# Patient Record
Sex: FEMALE | Race: WHITE | URBAN - METROPOLITAN AREA
[De-identification: names, ages, dates, MRNs, and addresses within clinical notes are randomized per-mention and may not be internally consistent; named-entity substitution may affect disease eponyms.]

---

## 2017-12-01 ENCOUNTER — OFFICE VISIT (OUTPATIENT)
Dept: ORTHOPEDIC SURGERY | Age: 32
End: 2017-12-01

## 2017-12-01 VITALS — HEIGHT: 62 IN | WEIGHT: 150 LBS | BODY MASS INDEX: 27.6 KG/M2

## 2017-12-01 PROCEDURE — 99243 OFF/OP CNSLTJ NEW/EST LOW 30: CPT | Performed by: ORTHOPAEDIC SURGERY

## 2017-12-01 PROCEDURE — G8484 FLU IMMUNIZE NO ADMIN: HCPCS | Performed by: ORTHOPAEDIC SURGERY

## 2017-12-01 PROCEDURE — G8419 CALC BMI OUT NRM PARAM NOF/U: HCPCS | Performed by: ORTHOPAEDIC SURGERY

## 2017-12-01 PROCEDURE — 20550 NJX 1 TENDON SHEATH/LIGAMENT: CPT | Performed by: ORTHOPAEDIC SURGERY

## 2017-12-01 PROCEDURE — G8427 DOCREV CUR MEDS BY ELIG CLIN: HCPCS | Performed by: ORTHOPAEDIC SURGERY

## 2017-12-01 NOTE — PROGRESS NOTES
CC:  Finger pain    HISTORY OF PRESENT ILLNESS:   Carloz Ambrocio is a 28 y.o. female right-hand-dominant, history of rheumatoid arthritis, no history of diabetes, who presents for evaluation and treatment of bilateral hand triggering, most symptomatic right ring finger, that began years ago but has been worsening over the past 2-3 months. This affects daily activities involving gripping. Treatment to date has been NSAID's, without significant relief. She has locking daily of both ring fingers, worse on the right. 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 A specialty hand and upper extremity consultation was requested by Dr. Taylor Falcon for evaluation treatment of finger triggering    Medical History:  No past medical history on file. No past surgical history on file. No family history on file. Social History     Social History    Marital status: Legally      Spouse name: N/A    Number of children: N/A    Years of education: N/A     Social History Main Topics    Smoking status: Current Every Day Smoker    Smokeless tobacco: Never Used    Alcohol use None    Drug use: Unknown    Sexual activity: Not Asked     Other Topics Concern    None     Social History Narrative    None     No current outpatient prescriptions on file. No current facility-administered medications for this visit. Allergies   Allergen Reactions    Clindamycin Rash       ROS:  ROS neg except for positives in HPI    PHYSICAL EXAMINATION:   Gen/Psych: Examination reveals a pleasant individual in no acute distress. The patient is oriented to time, place and person. The patient's mood and affect are appropriate. Lymph: The lymphatic examination bilaterally reveals all areas to be without enlargement or induration. Skin intact without lymphadenopathy, discoloration, or abnormal temperature. Vascular: There is intact, symmetric circulation in both upper extremities.     Musculoskeletal       Cervical spine, shoulders, elbows, wrist:  satisfactory baseline range of motion, strength, and stability bilaterally        bilateral long finger, ring finger, thumb:                               moderate  tenderness at the A1 pulley                            moderate triggering with flexion and  extension. There is locking and unlocking of both ring fingers    Neurological:  Sensation is subjectively normal in hands bilaterally    Radiology:     None today  Additional Studies:    IMPRESSION AND PLAN: Trigger digits right and left hand. Right ring finger most symptomatic    I discussed the entity of trigger finger with the patient. I fully outlined the mechanics behind the triggering and locking and appropriate conservative and surgical options. All their questions were answered fully. At this point the patient is symptomatic, and injection was recommended. We discussed trigger finger injection right ring finger, she was in agreement. The risks and benefits of cortisone injection were discussed thoroughly. Risks discussed included infection, adverse drug reaction, increased pain post-injection, skin de-pigmentation, fatty atrophy, and persistent clinical symptoms despite injection. Use of ethyl chloride spray during injection to decrease injection site pain was discussed. The injection was given after cleansing the skin with alcohol. The right ring finger trigger digit was injected with 1 mL of 1% lidocaine without epinephrine and 1 mL of Celestone without difficulty. The patient tolerated the injection well and a Band-aid was placed. Discussed activity modifications and splinting of the digits at night. She will follow up as symptoms dictate. We appreciate the patient referral.  All questions and concerns were addressed today. Patient is in agreement with the plan.         Joaquin Schuster MD  Hand & Upper Extremity Surgery  [address] [facility] partner of [facility] ([facility])

## 2017-12-06 ENCOUNTER — TELEPHONE (OUTPATIENT)
Dept: ORTHOPEDIC SURGERY | Age: 32
End: 2017-12-06

## 2017-12-10 RX ORDER — OXYCODONE HYDROCHLORIDE AND ACETAMINOPHEN 5; 325 MG/1; MG/1
2 TABLET ORAL PRN
Status: ACTIVE | OUTPATIENT
Start: 2017-12-10 | End: 2017-12-10

## 2017-12-10 RX ORDER — OXYCODONE HYDROCHLORIDE AND ACETAMINOPHEN 5; 325 MG/1; MG/1
1 TABLET ORAL PRN
Status: ACTIVE | OUTPATIENT
Start: 2017-12-10 | End: 2017-12-10

## 2017-12-10 RX ORDER — HYDRALAZINE HYDROCHLORIDE 20 MG/ML
5 INJECTION INTRAMUSCULAR; INTRAVENOUS EVERY 30 MIN PRN
Status: DISCONTINUED | OUTPATIENT
Start: 2017-12-10 | End: 2017-12-12 | Stop reason: HOSPADM

## 2017-12-10 RX ORDER — LABETALOL HYDROCHLORIDE 5 MG/ML
5 INJECTION, SOLUTION INTRAVENOUS
Status: DISCONTINUED | OUTPATIENT
Start: 2017-12-10 | End: 2017-12-12 | Stop reason: HOSPADM

## 2017-12-10 RX ORDER — DIPHENHYDRAMINE HYDROCHLORIDE 50 MG/ML
6.25 INJECTION INTRAMUSCULAR; INTRAVENOUS
Status: ACTIVE | OUTPATIENT
Start: 2017-12-10 | End: 2017-12-10

## 2017-12-10 RX ORDER — HYDROMORPHONE HCL 110MG/55ML
0.5 PATIENT CONTROLLED ANALGESIA SYRINGE INTRAVENOUS EVERY 10 MIN PRN
Status: DISCONTINUED | OUTPATIENT
Start: 2017-12-10 | End: 2017-12-12 | Stop reason: HOSPADM

## 2017-12-10 RX ORDER — HYDROMORPHONE HCL 110MG/55ML
0.5 PATIENT CONTROLLED ANALGESIA SYRINGE INTRAVENOUS EVERY 5 MIN PRN
Status: DISCONTINUED | OUTPATIENT
Start: 2017-12-10 | End: 2017-12-12 | Stop reason: HOSPADM

## 2017-12-10 RX ORDER — ONDANSETRON 2 MG/ML
4 INJECTION INTRAMUSCULAR; INTRAVENOUS EVERY 30 MIN PRN
Status: DISCONTINUED | OUTPATIENT
Start: 2017-12-10 | End: 2017-12-12 | Stop reason: HOSPADM

## 2017-12-10 RX ORDER — MEPERIDINE HYDROCHLORIDE 25 MG/ML
12.5 INJECTION INTRAMUSCULAR; INTRAVENOUS; SUBCUTANEOUS EVERY 5 MIN PRN
Status: DISCONTINUED | OUTPATIENT
Start: 2017-12-10 | End: 2017-12-12 | Stop reason: HOSPADM

## 2017-12-11 ENCOUNTER — HOSPITAL ENCOUNTER (OUTPATIENT)
Dept: SURGERY | Age: 32
Discharge: HOME | End: 2017-12-11
Attending: ORTHOPAEDIC SURGERY | Admitting: ORTHOPAEDIC SURGERY

## 2017-12-11 VITALS
WEIGHT: 150 LBS | RESPIRATION RATE: 16 BRPM | BODY MASS INDEX: 27.6 KG/M2 | HEIGHT: 62 IN | DIASTOLIC BLOOD PRESSURE: 95 MMHG | TEMPERATURE: 98.5 F | HEART RATE: 76 BPM | OXYGEN SATURATION: 99 % | SYSTOLIC BLOOD PRESSURE: 136 MMHG

## 2017-12-11 LAB — PREGNANCY, URINE: NEGATIVE

## 2017-12-11 RX ORDER — 0.9 % SODIUM CHLORIDE 0.9 %
10 VIAL (ML) INJECTION PRN
Status: DISCONTINUED | OUTPATIENT
Start: 2017-12-11 | End: 2017-12-12 | Stop reason: HOSPADM

## 2017-12-11 RX ORDER — OXYCODONE HYDROCHLORIDE AND ACETAMINOPHEN 5; 325 MG/1; MG/1
TABLET ORAL
Status: DISPENSED
Start: 2017-12-11 | End: 2017-12-11

## 2017-12-11 RX ORDER — SODIUM CHLORIDE, SODIUM LACTATE, POTASSIUM CHLORIDE, CALCIUM CHLORIDE 600; 310; 30; 20 MG/100ML; MG/100ML; MG/100ML; MG/100ML
INJECTION, SOLUTION INTRAVENOUS CONTINUOUS
Status: DISCONTINUED | OUTPATIENT
Start: 2017-12-11 | End: 2017-12-12 | Stop reason: HOSPADM

## 2017-12-11 RX ORDER — IBUPROFEN 200 MG
200 TABLET ORAL EVERY 6 HOURS PRN
COMMUNITY
End: 2018-04-23 | Stop reason: ALTCHOICE

## 2017-12-11 RX ORDER — OXYCODONE HYDROCHLORIDE AND ACETAMINOPHEN 5; 325 MG/1; MG/1
1 TABLET ORAL ONCE
Status: COMPLETED | OUTPATIENT
Start: 2017-12-11 | End: 2017-12-11

## 2017-12-11 RX ORDER — OXYCODONE HYDROCHLORIDE AND ACETAMINOPHEN 5; 325 MG/1; MG/1
1 TABLET ORAL EVERY 6 HOURS PRN
Qty: 25 TABLET | Refills: 0 | Status: SHIPPED | OUTPATIENT
Start: 2017-12-11 | End: 2017-12-18

## 2017-12-11 RX ORDER — LIDOCAINE HYDROCHLORIDE 10 MG/ML
1 INJECTION, SOLUTION EPIDURAL; INFILTRATION; INTRACAUDAL; PERINEURAL
Status: COMPLETED | OUTPATIENT
Start: 2017-12-11 | End: 2017-12-11

## 2017-12-11 RX ORDER — 0.9 % SODIUM CHLORIDE 0.9 %
10 VIAL (ML) INJECTION EVERY 12 HOURS SCHEDULED
Status: DISCONTINUED | OUTPATIENT
Start: 2017-12-11 | End: 2017-12-12 | Stop reason: HOSPADM

## 2017-12-11 RX ADMIN — LIDOCAINE HYDROCHLORIDE 1 ML: 10 INJECTION, SOLUTION EPIDURAL; INFILTRATION; INTRACAUDAL; PERINEURAL at 08:15

## 2017-12-11 RX ADMIN — SODIUM CHLORIDE, SODIUM LACTATE, POTASSIUM CHLORIDE, CALCIUM CHLORIDE: 600; 310; 30; 20 INJECTION, SOLUTION INTRAVENOUS at 08:15

## 2017-12-11 RX ADMIN — OXYCODONE HYDROCHLORIDE AND ACETAMINOPHEN 1 TABLET: 5; 325 TABLET ORAL at 09:40

## 2017-12-11 ASSESSMENT — PAIN DESCRIPTION - PAIN TYPE
TYPE: SURGICAL PAIN
TYPE: SURGICAL PAIN

## 2017-12-11 ASSESSMENT — PAIN DESCRIPTION - DESCRIPTORS
DESCRIPTORS: ACHING

## 2017-12-11 ASSESSMENT — PAIN SCALES - GENERAL
PAINLEVEL_OUTOF10: 0
PAINLEVEL_OUTOF10: 3
PAINLEVEL_OUTOF10: 2
PAINLEVEL_OUTOF10: 3

## 2017-12-11 ASSESSMENT — PAIN DESCRIPTION - LOCATION
LOCATION: HAND
LOCATION: HAND

## 2017-12-11 ASSESSMENT — PAIN DESCRIPTION - ONSET
ONSET: ON-GOING
ONSET: ON-GOING

## 2017-12-11 ASSESSMENT — PAIN DESCRIPTION - FREQUENCY
FREQUENCY: INTERMITTENT
FREQUENCY: INTERMITTENT

## 2017-12-11 ASSESSMENT — PAIN - FUNCTIONAL ASSESSMENT: PAIN_FUNCTIONAL_ASSESSMENT: 0-10

## 2017-12-11 ASSESSMENT — PAIN DESCRIPTION - ORIENTATION
ORIENTATION: RIGHT
ORIENTATION: RIGHT

## 2017-12-11 NOTE — PROGRESS NOTES
Phase II:  1. Patient is identified using name and the date of birth. 2. The patient is free from signs and symptoms of chemical, electrical, laser, radiation, positioning, or transfer/transport injury. 3. The patient receives appropriate medication(s), safely administered during the Perioperative period. 4. The patient has wound/tissue perfusion consistent with or improved from baseline levels established preoperatively. 5. The patient is at or returning to normothermia at the conclusion of the immediate postoperative period. 6. The patient's fluid, electrolyte, and acid base balances are consistent with or improved from baseline levels established preoperatively. 7. The patient's pulmonary function is consistent with or improved from baseline levels established preoperatively. 8. The patient's cardiovascular status is consistent with or improved from baseline levels established preoperatively. 9. The patient/caregiver demonstrates knowledge of nutritional management related to the operative or other invasive procedure. 10. The patient/caregiver demonstrates knowledge of medication, pain, and wound management. 11. The patient participates in the rehabilitation process as applicable. 12. The patient/caregiver participates in decisions affection his or her Perioperative plan of care. 13. The patient's care is consistent with the individualized Perioperative plan of care. 14. The patient's right to privacy is maintained. 15. The patient is the recipient of competent and ethical care within legal standards of practice. 16. The patient's value system, lifestyle, ethnicity, and culture are considered, respected, and incorporated in the Perioperative plan of care and understands special services available. 17. The patient demonstrates and/or reports adequate pain control throughout the the Perioperative period. 18.  The patient's neurological status is consistent with or improved from baseline levels

## 2017-12-11 NOTE — PROGRESS NOTES
Pt arrived from OR, report received, awake, A&O, at bedside to monitor, vitals stable, patient denies pain

## 2017-12-11 NOTE — OP NOTE
Phil Mary (1985)    Date of Surgery- 12/11/2017    Pre-Op Diagnoses:  1. Trigger digit, right ring finger  2. Left thumb trigger digit  3. Left ring finger trigger digit              Post-op Diagnoses:   1. Same     2. Same  3. Same    Procedure(s) Performed:  1. Release trigger digit, right ring finger, A1 pulley release     2. Cortisone injection left thumb trigger digit  3. Cortisone injection left ring finger trigger digit    Surgeon: Bridgette Barreto MD    Anesthesia Type:   Local/MAC    Blood Loss:   Minimal    Antibiotics:  See Anesthesia Note    DVT prophylaxis:  See Anesthesia Note    Pathology:   None    Complications:   None    Assistant:  None    The surgical sites were marked in preop holding by Dr John Rubio. Risks and benefits of the procedure were discussed again with the patient. The informed and signed consent was on the chart. The patient was brought to the operating and room and placed in the supine position. After the induction of anesthesia a standard time-out was performed. Description of the Procedure: The right upper extremity had been prepped and draped in the normal sterile fashion. Timeout was held before the local injection and then repeat Timeout was held after the formal draping procedure. The right upper extremity was exsanguinated and the tourniquet was inflated to 250 mmHg. A standard 1.5 cm oblique incision was created over the A1 pulley through skin only, overlying the right ring finger. Blunt scissor dissection was taken to the subcutaneous tissues down to the flexor tendon sheath. The A1 pulley was identified and isolated. Blunt retractors safely and gently retracted the neurovascular bundles on either side. Under direct visualization, the A1 pulley was released completely. The A0 pulley fibers were also identified and released. The A2 pulley was protected.   At this time the FDP and FDS had excellent excursion up through the wound. The patient was under light sedation, was able to flex and extend the finger gently, no persistent clicking or locking. No other abnormality was noted. The wound was copiously irrigated and then the skin was closed with interrupted nylon suture. Tourniquet was deflated and all fingers were warm and well perfused. Soft sterile dressing was placed. Under sterile conditions, both the left trigger thumb and left ring finger trigger digit were each separately injected with 1cc of 1% lidocaine and 1cc of celestone without difficulty. Bandaids were placed. Patient was awoken from sedation, tolerated the procedure well and was taken to the postanesthesia recovery unit in good condition. No complications during the case. Findings:         Intervention:  1% Lidocaine, 0.25% Marcaine Local injection, no epinephrine    Other Notes: Follow-up 7-10 days, wound inspection, likely suture removal.  Home exercise program for ROM, massage, progressive activities. Hand Therapy referral with modalities, if needed, or if patient desires.         Denia Cherry MD  Hand & Upper Extremity Surgery  [address] [facility] of [facility] ([facility])

## 2017-12-11 NOTE — ANESTHESIA POST-OP
Postoperative Anesthesia Note    Name:    Bethany Maxwell  MRN:      [id number]    Patient Vitals for the past 12 hrs:   BP Temp Temp src Pulse Resp SpO2 Height Weight   12/11/17 0935 (!) 136/95 - - 76 16 99 % - -   12/11/17 0921 126/84 - - 74 16 99 % - -   12/11/17 0916 121/80 - - 80 15 97 % - -   12/11/17 0911 133/85 - - 79 15 99 % - -   12/11/17 0906 138/88 98.5 °F (36.9 °C) Temporal 85 14 99 % - -   12/11/17 0749 (!) 146/93 98 °F (36.7 °C) Temporal 91 16 100 % 5' 2\" (1.575 m) 150 lb (68 kg)        LABS:    CBC  No results found for: WBC, HGB, HCT, PLT  RENAL  No results found for: NA, K, CL, CO2, BUN, CREATININE, GLUCOSE  COAGS  No results found for: PROTIME, INR, APTT    Intake & Output: In: 400 [I.V.:400]  Out: -     Nausea & Vomiting:  No    Level of Consciousness:  Awake    Pain Assessment:  Adequate analgesia    Anesthesia Complications:  No apparent anesthetic complications    SUMMARY      Vital signs stable  OK to discharge from Stage I post anesthesia care.   Care transferred from Anesthesiology department on discharge from perioperative area

## 2017-12-11 NOTE — ANESTHESIA PRE-OP
Ready to quit: Not Answered  Counseling given: Not Answered      Vital Signs (Current): There were no vitals filed for this visit. BP Readings from Last 3 Encounters:   No data found for BP       NPO Status:                                                                                 BMI:   Wt Readings from Last 3 Encounters:   12/01/17 150 lb (68 kg)     There is no height or weight on file to calculate BMI. Anesthesia Evaluation  Patient summary reviewed and Nursing notes reviewed no history of anesthetic complications:   Airway: Mallampati: II     Neck ROM: full   Dental:          Pulmonary:                              Cardiovascular:                      Neuro/Psych:               GI/Hepatic/Renal:             Endo/Other:                     Abdominal:           Vascular:                                        Anesthesia Plan      MAC     ASA 1       Induction: intravenous. Anesthetic plan and risks discussed with patient. Plan discussed with CRNA.                   Precious Leyva MD   12/10/2017

## 2017-12-20 ENCOUNTER — OFFICE VISIT (OUTPATIENT)
Dept: ORTHOPEDIC SURGERY | Age: 32
End: 2017-12-20

## 2017-12-20 VITALS — WEIGHT: 149.91 LBS | HEIGHT: 59 IN | BODY MASS INDEX: 30.22 KG/M2

## 2017-12-20 DIAGNOSIS — M65.341 TRIGGER RING FINGER OF RIGHT HAND: ICD-10-CM

## 2017-12-20 PROCEDURE — 99024 POSTOP FOLLOW-UP VISIT: CPT | Performed by: ORTHOPAEDIC SURGERY

## 2018-02-14 LAB
ABO/RH: NORMAL
ANTIBODY SCREEN: POSITIVE
HEPATITIS B SURFACE ANTIGEN INTERPRETATION: NEGATIVE
HIV-1 AND HIV-2 ANTIBODIES: NEGATIVE
RPR: NONREACTIVE
RUBELLA ANTIBODY IGG: NORMAL

## 2018-02-15 ENCOUNTER — HOSPITAL ENCOUNTER (OUTPATIENT)
Dept: ULTRASOUND IMAGING | Age: 33
Discharge: HOME | End: 2018-02-15
Attending: OBSTETRICS & GYNECOLOGY | Admitting: OBSTETRICS & GYNECOLOGY

## 2018-02-15 DIAGNOSIS — O09.891 SUPERVISION OF OTHER HIGH RISK PREGNANCIES, FIRST TRIMESTER: ICD-10-CM

## 2018-02-15 DIAGNOSIS — N91.2 AMENORRHEA: ICD-10-CM

## 2018-03-06 LAB — CHLAMYDIA TRACHOMATIS AMPLIFIED DET: NEGATIVE

## 2018-04-02 ENCOUNTER — HOSPITAL ENCOUNTER (OUTPATIENT)
Dept: OTHER | Age: 33
Discharge: HOME | End: 2018-04-30
Attending: OBSTETRICS & GYNECOLOGY | Admitting: OBSTETRICS & GYNECOLOGY

## 2018-04-03 ENCOUNTER — ROUTINE PRENATAL (OUTPATIENT)
Dept: PERINATAL CARE | Age: 33
End: 2018-04-03

## 2018-04-03 DIAGNOSIS — O35.9XX0 FETAL ABNORMALITY AFFECTING MANAGEMENT OF MOTHER, SINGLE OR UNSPECIFIED FETUS: ICD-10-CM

## 2018-04-03 DIAGNOSIS — Z36.89 ENCOUNTER FOR FETAL ANATOMIC SURVEY: Primary | ICD-10-CM

## 2018-04-23 ENCOUNTER — ROUTINE PRENATAL (OUTPATIENT)
Dept: PERINATAL CARE | Age: 33
End: 2018-04-23

## 2018-04-23 VITALS
SYSTOLIC BLOOD PRESSURE: 119 MMHG | DIASTOLIC BLOOD PRESSURE: 80 MMHG | HEART RATE: 60 BPM | WEIGHT: 169 LBS | BODY MASS INDEX: 34.07 KG/M2

## 2018-04-23 DIAGNOSIS — O35.9XX0 FETAL ABNORMALITY AFFECTING MANAGEMENT OF MOTHER, SINGLE OR UNSPECIFIED FETUS: Primary | ICD-10-CM

## 2018-05-01 ENCOUNTER — HOSPITAL ENCOUNTER (OUTPATIENT)
Dept: OTHER | Age: 33
Discharge: HOME | End: 2018-05-31
Attending: OBSTETRICS & GYNECOLOGY | Admitting: OBSTETRICS & GYNECOLOGY

## 2018-08-07 ENCOUNTER — HOSPITAL ENCOUNTER (INPATIENT)
Age: 33
LOS: 3 days | Discharge: HOME | DRG: 560 | End: 2018-08-10
Attending: OBSTETRICS & GYNECOLOGY | Admitting: OBSTETRICS & GYNECOLOGY
Payer: MEDICAID

## 2018-08-07 PROBLEM — O99.820 GBS (GROUP B STREPTOCOCCUS CARRIER), +RV CULTURE, CURRENTLY PREGNANT: Status: ACTIVE | Noted: 2018-08-07

## 2018-08-07 PROBLEM — Z3A.37 37 WEEKS GESTATION OF PREGNANCY: Status: ACTIVE | Noted: 2018-08-07

## 2018-08-07 PROBLEM — O42.90 DELAYED DELIVERY AFTER SROM (SPONTANEOUS RUPTURE OF MEMBRANES): Status: ACTIVE | Noted: 2018-08-07

## 2018-08-07 PROBLEM — O09.893 SUPERVISION OF OTHER HIGH RISK PREGNANCIES, THIRD TRIMESTER: Status: ACTIVE | Noted: 2018-08-07

## 2018-08-07 PROBLEM — O99.333: Status: ACTIVE | Noted: 2018-08-07

## 2018-08-07 PROBLEM — O99.213 MATERNAL OBESITY, ANTEPARTUM, THIRD TRIMESTER: Status: ACTIVE | Noted: 2018-08-07

## 2018-08-07 LAB
AMPHETAMINE SCREEN, URINE: NORMAL
BANDED NEUTROPHILS RELATIVE PERCENT: 13 % (ref 0–7)
BARBITURATE SCREEN URINE: NORMAL
BASOPHILS ABSOLUTE: 0 K/UL (ref 0–0.2)
BASOPHILS RELATIVE PERCENT: 0 %
BENZODIAZEPINE SCREEN, URINE: NORMAL
BUPRENORPHINE URINE: NORMAL
CANNABINOID SCREEN URINE: NORMAL
COCAINE METABOLITE SCREEN URINE: NORMAL
EOSINOPHILS ABSOLUTE: 0.2 K/UL (ref 0–0.6)
EOSINOPHILS RELATIVE PERCENT: 1 %
HCT VFR BLD CALC: 36.9 % (ref 36–48)
HEMATOLOGY PATH CONSULT: YES
HEMOGLOBIN: 12.4 G/DL (ref 12–16)
LYMPHOCYTES ABSOLUTE: 5.2 K/UL (ref 1–5.1)
LYMPHOCYTES RELATIVE PERCENT: 22 %
Lab: NORMAL
MCH RBC QN AUTO: 28.9 PG (ref 26–34)
MCHC RBC AUTO-ENTMCNC: 33.5 G/DL (ref 31–36)
MCV RBC AUTO: 86.1 FL (ref 80–100)
METHADONE SCREEN, URINE: NORMAL
MONOCYTES ABSOLUTE: 1.9 K/UL (ref 0–1.3)
MONOCYTES RELATIVE PERCENT: 8 %
NEUTROPHILS ABSOLUTE: 16.3 K/UL (ref 1.7–7.7)
NEUTROPHILS RELATIVE PERCENT: 56 %
OPIATE SCREEN URINE: NORMAL
OXYCODONE URINE: NORMAL
PDW BLD-RTO: 13.2 % (ref 12.4–15.4)
PH UA: 8
PHENCYCLIDINE SCREEN URINE: NORMAL
PLATELET # BLD: 405 K/UL (ref 135–450)
PLATELET SLIDE REVIEW: ADEQUATE
PMV BLD AUTO: 8.9 FL (ref 5–10.5)
PROPOXYPHENE SCREEN: NORMAL
RBC # BLD: 4.28 M/UL (ref 4–5.2)
SLIDE REVIEW: ABNORMAL
SPECIMEN STATUS: NORMAL
WBC # BLD: 23.6 K/UL (ref 4–11)

## 2018-08-07 PROCEDURE — 6360000002 HC RX W HCPCS: Performed by: OBSTETRICS & GYNECOLOGY

## 2018-08-07 PROCEDURE — 86780 TREPONEMA PALLIDUM: CPT

## 2018-08-07 PROCEDURE — 85025 COMPLETE CBC W/AUTO DIFF WBC: CPT

## 2018-08-07 PROCEDURE — 2580000003 HC RX 258: Performed by: OBSTETRICS & GYNECOLOGY

## 2018-08-07 PROCEDURE — 80307 DRUG TEST PRSMV CHEM ANLYZR: CPT

## 2018-08-07 PROCEDURE — 1220000000 HC SEMI PRIVATE OB R&B

## 2018-08-07 PROCEDURE — 6360000002 HC RX W HCPCS

## 2018-08-07 RX ORDER — SODIUM CHLORIDE 0.9 % (FLUSH) 0.9 %
10 SYRINGE (ML) INJECTION PRN
Status: DISCONTINUED | OUTPATIENT
Start: 2018-08-07 | End: 2018-08-07

## 2018-08-07 RX ORDER — SODIUM CHLORIDE 0.9 % (FLUSH) 0.9 %
10 SYRINGE (ML) INJECTION PRN
Status: DISCONTINUED | OUTPATIENT
Start: 2018-08-07 | End: 2018-08-08

## 2018-08-07 RX ORDER — NALBUPHINE HCL 10 MG/ML
10 AMPUL (ML) INJECTION
Status: DISCONTINUED | OUTPATIENT
Start: 2018-08-07 | End: 2018-08-07 | Stop reason: SDUPTHER

## 2018-08-07 RX ORDER — NITROFURANTOIN 25; 75 MG/1; MG/1
100 CAPSULE ORAL 2 TIMES DAILY
Status: ON HOLD | COMMUNITY
End: 2018-08-10 | Stop reason: HOSPADM

## 2018-08-07 RX ORDER — SODIUM CHLORIDE 0.9 % (FLUSH) 0.9 %
10 SYRINGE (ML) INJECTION EVERY 12 HOURS SCHEDULED
Status: DISCONTINUED | OUTPATIENT
Start: 2018-08-07 | End: 2018-08-07

## 2018-08-07 RX ORDER — SODIUM CHLORIDE, SODIUM LACTATE, POTASSIUM CHLORIDE, CALCIUM CHLORIDE 600; 310; 30; 20 MG/100ML; MG/100ML; MG/100ML; MG/100ML
INJECTION, SOLUTION INTRAVENOUS CONTINUOUS
Status: DISCONTINUED | OUTPATIENT
Start: 2018-08-07 | End: 2018-08-07

## 2018-08-07 RX ORDER — NALBUPHINE HCL 10 MG/ML
10 AMPUL (ML) INJECTION
Status: COMPLETED | OUTPATIENT
Start: 2018-08-07 | End: 2018-08-07

## 2018-08-07 RX ORDER — DIPHENHYDRAMINE HCL 25 MG
25 TABLET ORAL EVERY 4 HOURS PRN
Status: DISCONTINUED | OUTPATIENT
Start: 2018-08-07 | End: 2018-08-07

## 2018-08-07 RX ORDER — ACETAMINOPHEN 325 MG/1
650 TABLET ORAL EVERY 4 HOURS PRN
Status: DISCONTINUED | OUTPATIENT
Start: 2018-08-07 | End: 2018-08-07

## 2018-08-07 RX ORDER — SODIUM CHLORIDE 0.9 % (FLUSH) 0.9 %
10 SYRINGE (ML) INJECTION EVERY 12 HOURS SCHEDULED
Status: DISCONTINUED | OUTPATIENT
Start: 2018-08-07 | End: 2018-08-08

## 2018-08-07 RX ORDER — ONDANSETRON 2 MG/ML
4 INJECTION INTRAMUSCULAR; INTRAVENOUS EVERY 6 HOURS PRN
Status: DISCONTINUED | OUTPATIENT
Start: 2018-08-07 | End: 2018-08-07

## 2018-08-07 RX ORDER — ACYCLOVIR 400 MG/1
400 TABLET ORAL 3 TIMES DAILY
Status: ON HOLD | COMMUNITY
End: 2018-08-10 | Stop reason: HOSPADM

## 2018-08-07 RX ORDER — NALBUPHINE HCL 10 MG/ML
AMPUL (ML) INJECTION
Status: COMPLETED
Start: 2018-08-07 | End: 2018-08-07

## 2018-08-07 RX ORDER — NALBUPHINE HCL 10 MG/ML
10 AMPUL (ML) INJECTION
Status: DISCONTINUED | OUTPATIENT
Start: 2018-08-07 | End: 2018-08-07

## 2018-08-07 RX ORDER — SODIUM CHLORIDE, SODIUM LACTATE, POTASSIUM CHLORIDE, CALCIUM CHLORIDE 600; 310; 30; 20 MG/100ML; MG/100ML; MG/100ML; MG/100ML
INJECTION, SOLUTION INTRAVENOUS CONTINUOUS
Status: DISCONTINUED | OUTPATIENT
Start: 2018-08-07 | End: 2018-08-08

## 2018-08-07 RX ADMIN — NALBUPHINE HYDROCHLORIDE 10 MG: 10 INJECTION, SOLUTION INTRAMUSCULAR; INTRAVENOUS; SUBCUTANEOUS at 20:46

## 2018-08-07 RX ADMIN — SODIUM CHLORIDE, POTASSIUM CHLORIDE, SODIUM LACTATE AND CALCIUM CHLORIDE: 600; 310; 30; 20 INJECTION, SOLUTION INTRAVENOUS at 17:40

## 2018-08-07 RX ADMIN — NALBUPHINE HYDROCHLORIDE 10 MG: 10 INJECTION, SOLUTION INTRAMUSCULAR; INTRAVENOUS; SUBCUTANEOUS at 22:40

## 2018-08-07 RX ADMIN — Medication 2.5 MILLION UNITS: at 15:15

## 2018-08-07 RX ADMIN — Medication 2.5 MILLION UNITS: at 23:09

## 2018-08-07 RX ADMIN — DEXTROSE MONOHYDRATE 5 MILLION UNITS: 5 INJECTION INTRAVENOUS at 11:21

## 2018-08-07 RX ADMIN — Medication 1 MILLI-UNITS/MIN: at 17:00

## 2018-08-07 RX ADMIN — NALBUPHINE HYDROCHLORIDE 10 MG: 10 INJECTION, SOLUTION INTRAMUSCULAR; INTRAVENOUS; SUBCUTANEOUS at 18:14

## 2018-08-07 RX ADMIN — Medication 2.5 MILLION UNITS: at 19:06

## 2018-08-07 RX ADMIN — SODIUM CHLORIDE, POTASSIUM CHLORIDE, SODIUM LACTATE AND CALCIUM CHLORIDE: 600; 310; 30; 20 INJECTION, SOLUTION INTRAVENOUS at 11:21

## 2018-08-07 ASSESSMENT — PAIN SCALES - GENERAL: PAINLEVEL_OUTOF10: 8

## 2018-08-07 NOTE — H&P
Department of Obstetrics and Gynecology   Obstetrics History and Physical        CHIEF COMPLAINT:  C/O leaking of amniotic fluid since about 630. Also now having some contra    HISTORY OF PRESENT ILLNESS:  The patient is a 35 y.o. female at 41w10d. Patient presents with a chief complaint as above and is being admitted for management of labor    Estimated Due Date: Estimated Date of Delivery: 18    PRENATAL CARE:  Complicated by HR preg w Maternal Obesity; Smoker, Recurrent UTI on prophylaxis, HSV prevention dosing, and fetal aberrant subclavian (For  echo about 1 wk of age)    PAST OB HISTORY:  OB History      Para Term  AB Living    3 2   2   2    SAB TAB Ectopic Molar Multiple Live Births              2        Past Medical History:        Diagnosis Date    Abnormal Pap smear of cervix     Chronic kidney disease     Fibromyalgia     Herpes simplex virus (HSV) infection     Migraines     Rheumatoid arthritis ([facility] [address]     Smoker      Past Surgical History:        Procedure Laterality Date    FINGER TRIGGER RELEASE Right     ring finger    FINGER TRIGGER RELEASE  2017    HAND SURGERY      RIGHT    HAND SURGERY      right     Allergies:  Clindamycin    Social History:    Social History     Social History    Marital status:      Spouse name: N/A    Number of children: N/A    Years of education: N/A     Occupational History    Not on file.      Social History Main Topics    Smoking status: Current Every Day Smoker     Packs/day: 0.25    Smokeless tobacco: Never Used    Alcohol use No    Drug use: No    Sexual activity: Yes     Partners: Male     Other Topics Concern    Not on file     Social History Narrative    No narrative on file     Family History:   Family History   Problem Relation Age of Onset    Stroke Father     No Known Problems Mother     No Known Problems Brother     No Known Problems Sister      Medications Prior to Admission:  Prescriptions Prior to Admission: acyclovir (ZOVIRAX) 400 MG tablet, Take 400 mg by mouth 3 times daily  nitrofurantoin, macrocrystal-monohydrate, (MACROBID) 100 MG capsule, Take 100 mg by mouth 2 times daily  Prenatal Vit w/Ma-Htrzguybh-AW (PNV PO), Take 1 tablet by mouth    REVIEW OF SYSTEMS:  CONSTITUTIONAL:  negative  RESPIRATORY:  negative  CARDIOVASCULAR:  negative  GASTROINTESTINAL:  negative  ALLERGIC/IMMUNOLOGIC:  negative  NEUROLOGICAL:  negative  BEHAVIOR/PSYCH:  negative    PHYSICAL EXAM:  Vitals:    08/07/18 1007 08/07/18 1106   BP: 136/88 129/80   Pulse: 92 86   Resp: 18 18   Temp: 97.9 °F (36.6 °C) 98.1 °F (36.7 °C)   TempSrc: Oral Oral   Weight: 182 lb (82.6 kg)    Height: 5' 2\" (1.575 m)      General appearance:  awake, alert, cooperative, no apparent distress, and appears stated age  Neurologic:  Awake, alert, oriented to name, place and time. Lungs:  No increased work of breathing, good air exchange  Abdomen:  Soft, non tender, gravid, consistent with her gestational age, EFW by Leopald's manouever was 3312 GM   Fetal heart rate:  Reassuring.   Pelvis:  Adequate pelvis  Cervix: 2 cm 7% soft -2  Contraction frequency:  4 minutes    Membranes:  Ruptured clear fluid    Labs:   CBC:   Lab Results   Component Value Date    WBC 23.6 08/07/2018    RBC 4.28 08/07/2018    HGB 12.4 08/07/2018    HCT 36.9 08/07/2018    MCV 86.1 08/07/2018    MCH 28.9 08/07/2018    MCHC 33.5 08/07/2018    RDW 13.2 08/07/2018     08/07/2018    MPV 8.9 08/07/2018       ASSESSMENT AND PLAN:  Labor: Admit, routine labor orders, Monitor for progress    Fetus: Reassuring  GBS: Yes    RENO ARCHER DO

## 2018-08-08 PROBLEM — O99.820 GBS (GROUP B STREPTOCOCCUS CARRIER), +RV CULTURE, CURRENTLY PREGNANT: Status: RESOLVED | Noted: 2018-08-07 | Resolved: 2018-08-08

## 2018-08-08 PROBLEM — O99.333: Status: RESOLVED | Noted: 2018-08-07 | Resolved: 2018-08-08

## 2018-08-08 PROBLEM — O09.893 SUPERVISION OF OTHER HIGH RISK PREGNANCIES, THIRD TRIMESTER: Status: RESOLVED | Noted: 2018-08-07 | Resolved: 2018-08-08

## 2018-08-08 PROBLEM — Z3A.37 37 WEEKS GESTATION OF PREGNANCY: Status: RESOLVED | Noted: 2018-08-07 | Resolved: 2018-08-08

## 2018-08-08 PROBLEM — O99.213 MATERNAL OBESITY, ANTEPARTUM, THIRD TRIMESTER: Status: RESOLVED | Noted: 2018-08-07 | Resolved: 2018-08-08

## 2018-08-08 LAB
HEMATOLOGY PATH CONSULT: NORMAL
TOTAL SYPHILLIS IGG/IGM: NORMAL

## 2018-08-08 PROCEDURE — 7200000001 HC VAGINAL DELIVERY

## 2018-08-08 PROCEDURE — 6370000000 HC RX 637 (ALT 250 FOR IP): Performed by: OBSTETRICS & GYNECOLOGY

## 2018-08-08 PROCEDURE — 1220000000 HC SEMI PRIVATE OB R&B

## 2018-08-08 PROCEDURE — 2580000003 HC RX 258: Performed by: OBSTETRICS & GYNECOLOGY

## 2018-08-08 RX ORDER — SODIUM CHLORIDE 0.9 % (FLUSH) 0.9 %
10 SYRINGE (ML) INJECTION EVERY 12 HOURS SCHEDULED
Status: DISCONTINUED | OUTPATIENT
Start: 2018-08-08 | End: 2018-08-10 | Stop reason: HOSPADM

## 2018-08-08 RX ORDER — DOCUSATE SODIUM 100 MG/1
100 CAPSULE, LIQUID FILLED ORAL 2 TIMES DAILY
Status: DISCONTINUED | OUTPATIENT
Start: 2018-08-08 | End: 2018-08-10 | Stop reason: HOSPADM

## 2018-08-08 RX ORDER — LANOLIN 100 %
OINTMENT (GRAM) TOPICAL PRN
Status: DISCONTINUED | OUTPATIENT
Start: 2018-08-08 | End: 2018-08-10 | Stop reason: HOSPADM

## 2018-08-08 RX ORDER — MISOPROSTOL 100 UG/1
200 TABLET ORAL
Status: DISCONTINUED | OUTPATIENT
Start: 2018-08-08 | End: 2018-08-10 | Stop reason: HOSPADM

## 2018-08-08 RX ORDER — SODIUM CHLORIDE, SODIUM LACTATE, POTASSIUM CHLORIDE, CALCIUM CHLORIDE 600; 310; 30; 20 MG/100ML; MG/100ML; MG/100ML; MG/100ML
INJECTION, SOLUTION INTRAVENOUS CONTINUOUS
Status: DISCONTINUED | OUTPATIENT
Start: 2018-08-08 | End: 2018-08-10 | Stop reason: HOSPADM

## 2018-08-08 RX ORDER — IBUPROFEN 800 MG/1
800 TABLET ORAL EVERY 6 HOURS PRN
Status: DISCONTINUED | OUTPATIENT
Start: 2018-08-08 | End: 2018-08-10 | Stop reason: HOSPADM

## 2018-08-08 RX ORDER — FAMOTIDINE 20 MG/1
20 TABLET, FILM COATED ORAL 2 TIMES DAILY PRN
Status: DISCONTINUED | OUTPATIENT
Start: 2018-08-08 | End: 2018-08-10 | Stop reason: HOSPADM

## 2018-08-08 RX ORDER — SODIUM CHLORIDE 0.9 % (FLUSH) 0.9 %
10 SYRINGE (ML) INJECTION PRN
Status: DISCONTINUED | OUTPATIENT
Start: 2018-08-08 | End: 2018-08-10 | Stop reason: HOSPADM

## 2018-08-08 RX ORDER — FERROUS SULFATE 325(65) MG
325 TABLET ORAL EVERY OTHER DAY
Status: DISCONTINUED | OUTPATIENT
Start: 2018-08-08 | End: 2018-08-10 | Stop reason: HOSPADM

## 2018-08-08 RX ORDER — ACETAMINOPHEN 325 MG/1
650 TABLET ORAL EVERY 4 HOURS PRN
Status: DISCONTINUED | OUTPATIENT
Start: 2018-08-08 | End: 2018-08-10 | Stop reason: HOSPADM

## 2018-08-08 RX ADMIN — DOCUSATE SODIUM 100 MG: 100 CAPSULE, LIQUID FILLED ORAL at 08:29

## 2018-08-08 RX ADMIN — DOCUSATE SODIUM 100 MG: 100 CAPSULE, LIQUID FILLED ORAL at 22:19

## 2018-08-08 RX ADMIN — IBUPROFEN 800 MG: 800 TABLET ORAL at 02:05

## 2018-08-08 RX ADMIN — IBUPROFEN 800 MG: 800 TABLET ORAL at 15:41

## 2018-08-08 RX ADMIN — SODIUM CHLORIDE, PRESERVATIVE FREE 10 ML: 5 INJECTION INTRAVENOUS at 08:28

## 2018-08-08 RX ADMIN — BENZOCAINE AND LEVOMENTHOL: 200; 5 SPRAY TOPICAL at 02:05

## 2018-08-08 RX ADMIN — IBUPROFEN 800 MG: 800 TABLET ORAL at 08:28

## 2018-08-08 RX ADMIN — WITCH HAZEL 40 EACH: 500 SOLUTION RECTAL; TOPICAL at 02:05

## 2018-08-08 RX ADMIN — IBUPROFEN 800 MG: 800 TABLET ORAL at 22:19

## 2018-08-08 ASSESSMENT — PAIN SCALES - GENERAL
PAINLEVEL_OUTOF10: 5
PAINLEVEL_OUTOF10: 3

## 2018-08-08 NOTE — LACTATION NOTE
This note was copied from a baby's chart. Lactation Progress Note      Data:     Called for initial breastfeeding for a multip 3/2. Mother  her other 2 children for a few months each with no issues. Infant is STS with mother after delivery. Action: Infant had already latched but came off crying when getting his temperature checked. Assisted mother with getting him to calm down and latch again. Reviewed  education since her children are 7 and 16. Educated on how often to feed, setting an alarm, avoiding pacifiers and supplements, cluster feeding, and what to expect in the first 48 hours. Encouraged mother to call for f/u assistance. Response: Mother was happy infant latched so easily and was doing well.

## 2018-08-08 NOTE — PROGRESS NOTES
Report received from BART Nelson. Bedside report given. Introduced myself to pt as her RN for the night. I put my name and phone number on the white board and showed pt how to use her room phone to get a hold of me. Pt was given her plan of care for the night. Call light within reach. Bed in lowest position and wheels are locked. Pt verbalized understanding and denies any further needs at this time. Continue to monitor.

## 2018-08-08 NOTE — PROGRESS NOTES
First time get up to [facility] w/ assist x 1 RN. Pt voided 300mL urine without difficulty. Pt performed aurelia care per self after instruction. New ice pack, peripad, and underwear provided. Pt back to bed w/ out incident, gait steady. Pt tolerated well.

## 2018-08-08 NOTE — PROGRESS NOTES
Patient moved to [facility] via wheelchair with belongings,  and infant. Infant suction and O2 set up at bedside. Patient to bed without incident.

## 2018-08-08 NOTE — PLAN OF CARE
Problem: VAGINAL DELIVERY - RECOVERY AND POST PARTUM  Goal: Vital signs are medically acceptable  Outcome: Ongoing    Goal: Patient will remain free of falls  Outcome: Ongoing    Goal: Fundus firm at midline  Outcome: Ongoing    Goal: Moderate rubra without clots, no purulent discharge, no foul smelling lochia  Outcome: Ongoing    Goal: Empties bladder  Outcome: Ongoing    Goal: Verbalizes understanding of normal bowel function resumption  Outcome: Ongoing    Goal: Edema will be absent or minimal  Outcome: Ongoing    Goal: Breasts are soft with nipple integrity intact  Outcome: Ongoing    Goal: Demonstrates appropriate breast feeding techniques  Outcome: Ongoing    Goal: Appropriate behavior observed  Outcome: Ongoing    Goal: Positive Mother-Baby interactions are observed  Outcome: Ongoing    Goal: Perineum intact without discharge or hematoma  Outcome: Ongoing    Goal: Ambulates independently  Outcome: Ongoing

## 2018-08-08 NOTE — PROGRESS NOTES
Patient actively pushing. RN remains in continuous attendance at the bedside. Assessment & evaluation of fetal heart rate ongoing via continuous EFM.

## 2018-08-09 LAB — HEMOGLOBIN: 9.3 G/DL (ref 12–16)

## 2018-08-09 PROCEDURE — 36415 COLL VENOUS BLD VENIPUNCTURE: CPT

## 2018-08-09 PROCEDURE — 1220000000 HC SEMI PRIVATE OB R&B

## 2018-08-09 PROCEDURE — 85018 HEMOGLOBIN: CPT

## 2018-08-09 PROCEDURE — 6370000000 HC RX 637 (ALT 250 FOR IP): Performed by: OBSTETRICS & GYNECOLOGY

## 2018-08-09 RX ADMIN — DOCUSATE SODIUM 100 MG: 100 CAPSULE, LIQUID FILLED ORAL at 09:57

## 2018-08-09 RX ADMIN — IBUPROFEN 800 MG: 800 TABLET ORAL at 18:17

## 2018-08-09 RX ADMIN — IBUPROFEN 800 MG: 800 TABLET ORAL at 04:11

## 2018-08-09 RX ADMIN — IBUPROFEN 800 MG: 800 TABLET ORAL at 09:57

## 2018-08-09 RX ADMIN — FERROUS SULFATE TAB 325 MG (65 MG ELEMENTAL FE) 325 MG: 325 (65 FE) TAB at 09:57

## 2018-08-09 ASSESSMENT — PAIN SCALES - GENERAL
PAINLEVEL_OUTOF10: 3
PAINLEVEL_OUTOF10: 4
PAINLEVEL_OUTOF10: 2

## 2018-08-09 NOTE — CARE COORDINATION
Follow up today. Infant's UDS is negative, however, per nursing, infant's last Ralph Score was 13. Reviewed Mob's [facility] record, attempted to see her however the picture person in process of taking pictures. Will come back this afternoon to meet with Mob. Cord tox pending.     Cesilia SILVA

## 2018-08-09 NOTE — CARE COORDINATION
Met with Alli this afternoon, Fob also present. Explained purpose of visit. Alli states she was drinking an \"all natural plant based tea\" that she ordered off on-line and feels this is what caused the positive UDS results during her [facility] in April. When writer asked her what kind of tea it is she could not really say, just \"natural plant based. \"  She states she is still drinking herbal tea but is now ordering from another provider. She showed writer the tea bag which appeared to be a ground tea and the bag was labeled [employer]. \"  Alli also admits to smoking a pack of cigarettes a day until a few weeks prior to the pregnancy. She also states she drinks a considerable amount of drinks containing caffeine. Alli denies using heroin, pain pills, meth, or any other illicit substance. She relates she used to go to pain management doctor for management of RA but states she stopped this many months prior to even becoming pregnant. Alli denies mental health concerns. Alli reports to have custody of her other children, ages 16 and 6. She denies involvement with CPS. She reports to be prepared for infant, has a crib for safe sleep, car seat, and other necessary supplies. Writer explained mandatory reporting to CPS if infant's cord tox is concerning. Infant is being monitored for withdrawal, last 4 scores 7,13,11,9,  SW will cont to follow/await cord results.   Amanda Chairez [facility]

## 2018-08-10 VITALS
SYSTOLIC BLOOD PRESSURE: 134 MMHG | TEMPERATURE: 98.1 F | HEART RATE: 59 BPM | DIASTOLIC BLOOD PRESSURE: 86 MMHG | RESPIRATION RATE: 16 BRPM | HEIGHT: 62 IN | BODY MASS INDEX: 33.49 KG/M2 | WEIGHT: 182 LBS

## 2018-08-10 PROCEDURE — 6370000000 HC RX 637 (ALT 250 FOR IP): Performed by: OBSTETRICS & GYNECOLOGY

## 2018-08-10 RX ADMIN — DOCUSATE SODIUM 100 MG: 100 CAPSULE, LIQUID FILLED ORAL at 11:37

## 2018-08-10 RX ADMIN — FERROUS SULFATE TAB 325 MG (65 MG ELEMENTAL FE) 325 MG: 325 (65 FE) TAB at 11:37

## 2018-08-10 RX ADMIN — IBUPROFEN 800 MG: 800 TABLET ORAL at 11:37

## 2018-08-10 ASSESSMENT — PAIN SCALES - GENERAL: PAINLEVEL_OUTOF10: 4

## 2018-08-10 NOTE — PLAN OF CARE
Problem: VAGINAL DELIVERY - RECOVERY AND POST PARTUM  Goal: Vital signs are medically acceptable  Outcome: Ongoing    Goal: Patient will remain free of falls  Outcome: Ongoing    Goal: Fundus firm at midline  Outcome: Ongoing    Goal: Moderate rubra without clots, no purulent discharge, no foul smelling lochia  Outcome: Ongoing

## 2018-08-10 NOTE — LACTATION NOTE
This note was copied from a baby's chart. Lactation Progress Note      Data:     F/u on primip breast feeder during lactation rounds. Pt reports baby is latching and breastfeeding well, and cluster feeding. Observed pt position baby to the breast well in cross cradle position. NATIVIDAD achieved easily with SRS and AS. Pt states that she feels like her milk is starting to come in. Action: Praise and reassurance given of good latch, and normal and expected cluster feeding. Encouraged to continue to BF ad daria with first signs of feeding cues to bring in and establish a good milk supply. BF education reviewed in discharge binder. Name and number on whiteboard. Encouraged to call for f/u prn. Response: Verbalized understanding and comfortable with breastfeeding. Will call for f/u prn.

## 2018-08-10 NOTE — FLOWSHEET NOTE
Discharge Phone Call Log  Patient Name: Ellyn Deras     [facility] Care Provider: Veronica Matos DO Discharge Date: 8/10/2018    Disposition of baby:    Phone Number: [phone] (home)     Attempts to Contact:  Date:    Nurse  Date:    Nurse  Date:    Nurse    Introduce yourself and explain the questionnaire. 1.  Now that you are at home is your pain being well controlled? Y/N   What pain reducing measures are you using? ____________________________________        Information for the patient's :  Calvin Betancur [[id number]]   Delivery Method: Vaginal, Spontaneous Delivery    2. Are you having any infant feeding issues? Y/N _____________________________      If breastfeeding, were you satisfied with the breastfeeding support services offered? Y/N  3. Any engorgement problems? Y/N  Have you had to supplement? Y/N  4. Have you made or have you already had your first appointment with the baby's doctor? Y/N If no, do you know when to schedule it? Y/N Do you have a safe crib, bassinet or play yard with a firm mattress for your infant to sleep in at home? Y/N  5. Have you scheduled your follow-up appointment? Y/N  If no, do you know when to schedule it? Y/N  6. Did your nurses and physicians include you in the plan of care, communicating with      you respectfully, and in a manner easy to understand? Y/N       Comments:  ___________________________________________________________  7. Is there anyone in particular you would like to mention who provided care for you?          ____________________________    8. Do you have any questions about your discharge instructions or the notebook? Y/N    9. Did your discharge occur in a timely manner? Y/N        Comments: __________________________________________________________    Remember to describe the hospital survey and ask patient to return it when possible.   Questions During Interview:__________________________________________________________  Teaching During interview :___________________________________________________________  ___________________________RN       Date:______________Time:________________

## 2020-01-08 ENCOUNTER — OFFICE VISIT (OUTPATIENT)
Dept: ORTHOPEDIC SURGERY | Age: 35
End: 2020-01-08
Payer: MEDICAID

## 2020-01-08 VITALS — WEIGHT: 182.1 LBS | BODY MASS INDEX: 33.51 KG/M2 | HEIGHT: 62 IN

## 2020-01-08 PROBLEM — M65.332 TRIGGER MIDDLE FINGER OF LEFT HAND: Status: ACTIVE | Noted: 2020-01-08

## 2020-01-08 PROCEDURE — G8484 FLU IMMUNIZE NO ADMIN: HCPCS | Performed by: ORTHOPAEDIC SURGERY

## 2020-01-08 PROCEDURE — G8417 CALC BMI ABV UP PARAM F/U: HCPCS | Performed by: ORTHOPAEDIC SURGERY

## 2020-01-08 PROCEDURE — 20550 NJX 1 TENDON SHEATH/LIGAMENT: CPT | Performed by: ORTHOPAEDIC SURGERY

## 2020-01-08 PROCEDURE — 4004F PT TOBACCO SCREEN RCVD TLK: CPT | Performed by: ORTHOPAEDIC SURGERY

## 2020-01-08 PROCEDURE — G8427 DOCREV CUR MEDS BY ELIG CLIN: HCPCS | Performed by: ORTHOPAEDIC SURGERY

## 2020-01-08 PROCEDURE — 99213 OFFICE O/P EST LOW 20 MIN: CPT | Performed by: ORTHOPAEDIC SURGERY

## 2020-01-08 RX ORDER — BETAMETHASONE SODIUM PHOSPHATE AND BETAMETHASONE ACETATE 3; 3 MG/ML; MG/ML
12 INJECTION, SUSPENSION INTRA-ARTICULAR; INTRALESIONAL; INTRAMUSCULAR; SOFT TISSUE ONCE
Status: COMPLETED | OUTPATIENT
Start: 2020-01-08 | End: 2020-01-08

## 2020-01-08 RX ADMIN — BETAMETHASONE SODIUM PHOSPHATE AND BETAMETHASONE ACETATE 12 MG: 3; 3 INJECTION, SUSPENSION INTRA-ARTICULAR; INTRALESIONAL; INTRAMUSCULAR; SOFT TISSUE at 13:46

## 2020-01-08 NOTE — PROGRESS NOTES
CC: Left hand pain    HISTORY OF PRESENT ILLNESS:   Neisha Swanson is a 29 y.o. female right-hand-dominant, nondiabetic, history of rheumatoid arthritis, who presents for evaluation and treatment of left middle finger triggering that began approximately 8 months ago. This affects daily activities involving gripping. Treatment to date has been NSAID's, without significant relief. We have provided a trigger digit release on the right hand in the past with a good outcome, ring finger.   She has a  at home, she would like to avoid surgery at this point    Medical History:  Past Medical History:   Diagnosis Date    Abnormal Pap smear of cervix     Chronic kidney disease     Fibromyalgia     Herpes simplex virus (HSV) infection     Migraines     Rheumatoid arthritis ([address]     Smoker      Past Surgical History:   Procedure Laterality Date    FINGER TRIGGER RELEASE Right     ring finger    FINGER TRIGGER RELEASE  2017    HAND SURGERY      RIGHT    HAND SURGERY      right     Family History   Problem Relation Age of Onset    Stroke Father     No Known Problems Mother     No Known Problems Brother     No Known Problems Sister      Social History     Socioeconomic History    Marital status:      Spouse name: Not on file    Number of children: Not on file    Years of education: Not on file    Highest education level: Not on file   Occupational History    Not on file   Social Needs    Financial resource strain: Not on file    Food insecurity:     Worry: Not on file     Inability: Not on file    Transportation needs:     Medical: Not on file     Non-medical: Not on file   Tobacco Use    Smoking status: Current Every Day Smoker     Packs/day: 0.25    Smokeless tobacco: Never Used   Substance and Sexual Activity    Alcohol use: No    Drug use: No    Sexual activity: Yes     Partners: Male   Lifestyle    Physical activity:     Days per week: Not on file     Minutes per session: Not on file  Stress: Not on file   Relationships    Social connections:     Talks on phone: Not on file     Gets together: Not on file     Attends Yarsani service: Not on file     Active member of club or organization: Not on file     Attends meetings of clubs or organizations: Not on file     Relationship status: Not on file    Intimate partner violence:     Fear of current or ex partner: Not on file     Emotionally abused: Not on file     Physically abused: Not on file     Forced sexual activity: Not on file   Other Topics Concern    Not on file   Social History Narrative    Not on file     Current Outpatient Medications   Medication Sig Dispense Refill    Prenatal Vit w/Dv-Kegvsbhqz-VG (PNV PO) Take 1 tablet by mouth       No current facility-administered medications for this visit. Allergies   Allergen Reactions    Clindamycin Rash       ROS:  ROS neg except for positives in HPI    PHYSICAL EXAMINATION:   Gen/Psych: Examination reveals a pleasant individual in no acute distress. The patient is oriented to time, place and person. The patient's mood and affect are appropriate. Lymph: The lymphatic examination bilaterally reveals all areas to be without enlargement or induration. Skin intact without lymphadenopathy, discoloration, or abnormal temperature. Vascular: There is intact, symmetric circulation in both upper extremities. Musculoskeletal       Cervical spine, shoulders, elbows, wrist:  satisfactory baseline range of motion, strength, and stability bilaterally        left long finger:                               moderate  tenderness at the A1 pulley                            moderate triggering with flexion and     extension.          All other fingers/thumbs:   No tenderness at A1 pulley, no triggering with                                                   flexion/extension, and no locking    Neurological:  Sensation is subjectively normal in hands bilaterally    Radiology: IMPRESSION AND PLAN: left middle finger trigger digit    I discussed the entity of trigger finger with the patient. I fully outlined the mechanics behind the triggering and locking and appropriate conservative and surgical options. All their questions were answered fully. At this point the patient is symptomatic, and injection was recommended. Patient was in agreement. Hopeful to avoid surgery. The risks and benefits of cortisone injection were discussed thoroughly. Risks discussed included infection, adverse drug reaction, increased pain post-injection, skin de-pigmentation, fatty atrophy, and persistent clinical symptoms despite injection. Use of ethyl chloride spray during injection to decrease injection site pain was discussed. The injection was given after cleansing the skin with alcohol. The left middle finger trigger digit and flexor tendon sheath was injected with 1 cc of 1% lidocaine without epinephrine and 1 cc of Celestone without difficulty. The patient tolerated the injection well and a Band-aid was placed. Aluminum foam splint at night over the next couple weeks. Follow-up if symptoms not resolved    All questions and concerns were addressed today. Patient is in agreement with the plan. Jessica Brown MD  Hand & Upper Extremity Surgery  [address]  A partner of [facility]        Please note that this transcription was created using voice recognition software. Any errors are unintentional and may be due to voice recognition transcription.

## 2020-07-20 ENCOUNTER — TELEPHONE (OUTPATIENT)
Dept: ORTHOPEDIC SURGERY | Age: 35
End: 2020-07-20

## 2020-07-20 ENCOUNTER — OFFICE VISIT (OUTPATIENT)
Dept: ORTHOPEDIC SURGERY | Age: 35
End: 2020-07-20
Payer: MEDICAID

## 2020-07-20 VITALS — BODY MASS INDEX: 33.49 KG/M2 | HEIGHT: 62 IN | WEIGHT: 182 LBS

## 2020-07-20 PROCEDURE — 4004F PT TOBACCO SCREEN RCVD TLK: CPT | Performed by: FAMILY MEDICINE

## 2020-07-20 PROCEDURE — G8427 DOCREV CUR MEDS BY ELIG CLIN: HCPCS | Performed by: FAMILY MEDICINE

## 2020-07-20 PROCEDURE — 99203 OFFICE O/P NEW LOW 30 MIN: CPT | Performed by: FAMILY MEDICINE

## 2020-07-20 PROCEDURE — G8417 CALC BMI ABV UP PARAM F/U: HCPCS | Performed by: FAMILY MEDICINE

## 2020-07-20 RX ORDER — METHYLPREDNISOLONE 4 MG/1
TABLET ORAL
Qty: 21 KIT | Refills: 0 | Status: SHIPPED | OUTPATIENT
Start: 2020-07-20 | End: 2020-08-03 | Stop reason: ALTCHOICE

## 2020-07-20 RX ORDER — DICLOFENAC SODIUM 75 MG/1
75 TABLET, DELAYED RELEASE ORAL 2 TIMES DAILY
Qty: 60 TABLET | Refills: 3 | Status: SHIPPED | OUTPATIENT
Start: 2020-07-20 | End: 2020-11-09 | Stop reason: ALTCHOICE

## 2020-07-20 NOTE — PROGRESS NOTES
Chief Complaint  Neck Pain (neck pain on & off xyears getting worse having numbness down left  arm with numbness & tingling in fingers)        History of Present Illness:  Rock Guo is a 28 y.o. 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 Gina Yates, he does have a history of rheumatoid arthritis and started treatment many years ago out in [facility] states that her cervical spine is been bothering her for at least 10+ years which she was attributed to her rheumatoid arthritis. Once again she was previously established from a rheumatologic standpoint however lost her rheumatologist [facility] [place] closed. She was being treated appropriately including trials of methotrexate but was ultimately taken off of this and she has not had any type of rheumatologic treatment for a number of years. She states that her cervical symptoms have worsened substantially over the past several months since roughly April 2020 but is become much worse since early July 2020. There is no history of injury or fall and she does have considerable stiffness and pain to the cervical spine radiating around to the left greater than right sternoclavicular region but more recently has been having numbness and tingling in the left arm in the C7/T1 distribution. There is once again no history of actual injury or new activity prior to becoming symptomatic. She does have pain ranging between a 2 up to about an 8 out of 10 and does seem to be worse at night. She has tried 1 session of medical massage which is helped loosen up her trapezius and she does have some extension to the periscapular region.   Cervical rotation and extension does aggravate her symptoms as does housework and yard work. She has tried alternating Motrin and Tylenol with limited success. She has noticed possibly some weakness into her hand as well as numbness and tingling. She has noted that she will have to shake her hands awake in the morning and with driving. She is being seen today for orthopedic and sports consultation with imaging. Medical History    Patient's medications, allergies, past medical, surgical, social and family histories were reviewed and updated as appropriate. Review of Systems     Relevant review of systems reviewed 7/20/2020 and available in the patient's chart    Vital Signs    There were no vitals filed for this visit. General Exam:      Constitutional: Patient is adequately groomed with no evidence of malnutrition  DTRs: Deep tendon reflexes are intact  Mental Status: The patient is oriented to time, place and person. The patient's mood and affect are appropriate. Vascular: Examination reveals no swelling or calf tenderness. Peripheral pulses are palpable and 2+. Neurological: The patient has good coordination. There is no weakness or sensory deficit. Cervical Spine Examination    Inspection: There is no high-grade deformity soft tissue swelling or palpable spasm. No masses. Palpation: She does have tenderness along the cervical paraspinals and lower cervical facets with several trapezial trigger points and some tenderness over the superior rhomboid. She does have some sternoclavicular discomfort to palpation mildly left and right side without high-grade deformity. Rang of Motion: She does have reasonable cervical motion. Strength: I do not see definitive evidence of focal upper extremity motor deficits and her cervical isometric strength testing appears intact. Special Tests: She does display some pain with axial load testing. Equivocal Spurling's on the left and negative on the right.   Once again no focal upper extremity motor deficits noted. Skin: There are no rashes, ulcerations or lesions. Distal motor sensory and vascular exams intact. Gait: Reasonably fluid gait. Additional Comments:            Additional Examinations:      Right Upper Extremity:  Examination of the right upper extremity does not show any tenderness, deformity or injury. Range of motion is unremarkable. There is no gross instability. There are no rashes, ulcerations or lesions. Strength and tone are normal.  Left Upper Extremity: Examination of the left upper extremity does not show any tenderness, deformity or injury. Range of motion is unremarkable. There is no gross instability. There are no rashes, ulcerations or lesions. Strength and tone are normal.              Diagnostic Test Findings:    AP and lateral cervical spine films were obtained today and does show loss of cervical lordosis and she has almost a reversal of her lordosis. No obvious acute osseous injury or compression injury noted. Assessment: #1. Progressively worsening cervical pain with cervical myofascial pain and newer onset left upper extremity numbness and tingling (rule out occult disc herniation versus peripheral mononeuropathy)    #2. Chronic effectively untreated rheumatoid arthritis    Impression:       Encounter Diagnoses   Name Primary?     Neck pain Yes    Strain of neck muscle, initial encounter     Cervical myofascial pain syndrome     Left arm numbness        Office Procedures:       Orders Placed This Encounter   Procedures    XR CERVICAL SPINE (2-3 VIEWS)     Standing Status:   Future     Number of Occurrences:   1     Standing Expiration Date:   7/20/2021     Order Specific Question:   Reason for exam:     Answer:   pain    MRI CERVICAL SPINE WO CONTRAST     Standing Status:   Future     Standing Expiration Date:   7/20/2021     Scheduling Instructions:      [facility]      [address] [place], [address] [facility] [address] [place] [id number] #:      TIME AND DATE TBD      PLEASE CALL PATIENT ONCE APPROVED TO SCHEDULE       PUSH TO [email or web address] SYSTEM            Remember that it may take several business days to pre-cert your MRI through your insurance. Our office will contact you as soon as we have the approval. We will not give any test results over the phone. Please call [phone] once you have your test day and time to schedule a follow up with Dr. Johnny Mendez. Order Specific Question:   Reason for exam:     Answer:   R/O LEFT C6-C7, C7-T1 HNP    Ambulatory referral to Physical Therapy     Referral Priority:   Routine     Referral Type:   Eval and Treat     Referral Reason:   Specialty Services Required     Requested Specialty:   Physical Therapy     Number of Visits Requested:   [address] [place] [address] Jorge Alvarado MD, Rheumatology, [place]     Referral Priority:   Routine     Referral Type:   Eval and Treat     Referral Reason:   Specialty Services Required     Requested Specialty:   Rheumatology     Number of Visits Requested:   1    EMG     Standing Status:   Future     Standing Expiration Date:   7/20/2021     Scheduling Instructions:      SCHEDULE WITH DR. Ramirez Getting     Order Specific Question:   Which body part? Answer:   LEFT UPPER EXTREMITY R/O RADICULOPATHY VS. CTS       Treatment Plan:  Treatment options were discussed with Adrienne Rowland. We did review her plain films and exam findings. She has been increasingly symptomatic with multiple joint arthralgias as well as worsening cervical pain and newer onset left upper extremity numbness and tingling over the past several months. I would like for her to have a cervical MRI to evaluate for left-sided cervical disc herniation versus facet arthropathy/spondylolysis. I would also like for her to have left upper extremity EMG testing to evaluate for evidence of radiculopathy versus an occult carpal tunnel mononeuropathy.   We did set her up for physical therapy and we did place her on a Medrol Dosepak to be followed by diclofenac 75 mg 1 pill twice daily. We also gave her a referral to see Dr. Ceci Guido for rheumatologic consultation as I do believe that she should be established with a rheumatologist given the longstanding nature of her rheumatoid. Icing and activity modification importance of performing her exercise program was discussed. I will see her back post EMG and post cervical imaging. She will contact us in the interim with questions or concerns. This dictation was performed with a verbal recognition program (DRAGON) and it was checked for errors. It is possible that there are still dictated errors within this office note. If so, please bring any errors to my attention for an addendum. All efforts were made to ensure that this office note is accurate.

## 2020-07-20 NOTE — TELEPHONE ENCOUNTER
Spoke to patient and informed them that their MRI has been authorized and that they can call and schedule scan at their convenience. Also told them that they can call and schedule a f/u with Dr. Geetha Sandra once they have MRI scheduled, leaving at least 2-3 days for our office to receive their results.

## 2020-07-22 ENCOUNTER — TELEPHONE (OUTPATIENT)
Dept: INTERNAL MEDICINE CLINIC | Age: 35
End: 2020-07-22

## 2020-07-22 ENCOUNTER — OFFICE VISIT (OUTPATIENT)
Dept: RHEUMATOLOGY | Age: 35
End: 2020-07-22
Payer: MEDICAID

## 2020-07-22 VITALS
WEIGHT: 194 LBS | OXYGEN SATURATION: 98 % | SYSTOLIC BLOOD PRESSURE: 136 MMHG | DIASTOLIC BLOOD PRESSURE: 84 MMHG | TEMPERATURE: 98 F | HEIGHT: 62 IN | BODY MASS INDEX: 35.7 KG/M2 | HEART RATE: 85 BPM

## 2020-07-22 DIAGNOSIS — Z87.39 HISTORY OF RHEUMATOID ARTHRITIS: ICD-10-CM

## 2020-07-22 DIAGNOSIS — M46.1 SACROILIITIS (HCC): ICD-10-CM

## 2020-07-22 LAB
A/G RATIO: 2.2 (ref 1.1–2.2)
ALBUMIN SERPL-MCNC: 4.8 G/DL (ref 3.4–5)
ALP BLD-CCNC: 69 U/L (ref 40–129)
ALT SERPL-CCNC: 10 U/L (ref 10–40)
ANION GAP SERPL CALCULATED.3IONS-SCNC: 9 MMOL/L (ref 3–16)
AST SERPL-CCNC: 15 U/L (ref 15–37)
BASOPHILS ABSOLUTE: 0 K/UL (ref 0–0.2)
BASOPHILS RELATIVE PERCENT: 0.3 %
BILIRUB SERPL-MCNC: <0.2 MG/DL (ref 0–1)
BUN BLDV-MCNC: 17 MG/DL (ref 7–20)
C-REACTIVE PROTEIN: 1.1 MG/L (ref 0–5.1)
CALCIUM SERPL-MCNC: 9.4 MG/DL (ref 8.3–10.6)
CHLORIDE BLD-SCNC: 104 MMOL/L (ref 99–110)
CO2: 26 MMOL/L (ref 21–32)
CREAT SERPL-MCNC: 0.7 MG/DL (ref 0.6–1.1)
EOSINOPHILS ABSOLUTE: 0 K/UL (ref 0–0.6)
EOSINOPHILS RELATIVE PERCENT: 0 %
GFR AFRICAN AMERICAN: >60
GFR NON-AFRICAN AMERICAN: >60
GLOBULIN: 2.2 G/DL
GLUCOSE BLD-MCNC: 116 MG/DL (ref 70–99)
HCT VFR BLD CALC: 38.3 % (ref 36–48)
HEMOGLOBIN: 13.1 G/DL (ref 12–16)
LYMPHOCYTES ABSOLUTE: 1.1 K/UL (ref 1–5.1)
LYMPHOCYTES RELATIVE PERCENT: 10.7 %
MCH RBC QN AUTO: 29.6 PG (ref 26–34)
MCHC RBC AUTO-ENTMCNC: 34.2 G/DL (ref 31–36)
MCV RBC AUTO: 86.6 FL (ref 80–100)
MONOCYTES ABSOLUTE: 0.2 K/UL (ref 0–1.3)
MONOCYTES RELATIVE PERCENT: 1.8 %
NEUTROPHILS ABSOLUTE: 9.1 K/UL (ref 1.7–7.7)
NEUTROPHILS RELATIVE PERCENT: 87.2 %
PDW BLD-RTO: 13 % (ref 12.4–15.4)
PLATELET # BLD: 341 K/UL (ref 135–450)
PMV BLD AUTO: 7.8 FL (ref 5–10.5)
POTASSIUM SERPL-SCNC: 4.5 MMOL/L (ref 3.5–5.1)
RBC # BLD: 4.43 M/UL (ref 4–5.2)
SODIUM BLD-SCNC: 139 MMOL/L (ref 136–145)
TOTAL PROTEIN: 7 G/DL (ref 6.4–8.2)
WBC # BLD: 10.5 K/UL (ref 4–11)

## 2020-07-22 PROCEDURE — G8417 CALC BMI ABV UP PARAM F/U: HCPCS | Performed by: INTERNAL MEDICINE

## 2020-07-22 PROCEDURE — 99244 OFF/OP CNSLTJ NEW/EST MOD 40: CPT | Performed by: INTERNAL MEDICINE

## 2020-07-22 PROCEDURE — G8427 DOCREV CUR MEDS BY ELIG CLIN: HCPCS | Performed by: INTERNAL MEDICINE

## 2020-07-22 NOTE — PROGRESS NOTES
Trae Rodriguez MD  [facility]      RHEUMATOLOGY CONSULT NOTE    HISTORY OF PRESENT ILLNESS:    The pt is a 28 y.o. female referred by Lorri Fairbanks DO for evaluation due to history of rheumatoid arthritis    Patient states that at the age of 15, she started with pain and swelling affecting mainly the knees with significant morning stiffness but during her early 25s, she also started with pain and swelling affecting the hands as well as pain in the left lower back. She was diagnosed with rheumatoid arthritis and about 1 year later she started taking leflunomide for about 3 months which did not provide significant pain relief. Later on she tried Plaquenil 200 mg twice a day for 3 to 4 months with no significant improvement. Soon after, she tried methotrexate 02.2 mg/week and folic acid 1 mg daily with no significant relief either. Only high-dose of prednisone will control the symptoms. Currently on no medications for rheumatoid arthritis. About 1-1/2 years ago, she noticed inflammation of the fingers (such as fingers) as well as left de Quervain's tenosynovitis and bilateral plantar fasciitis that lasted for about 2 months. There was no associated inflammatory eyes (uveitis/iritis) or rashes. There is history of genital herpes but no any other sexually transmitted infection. Patient states having fatigue, non-refreshing sleep and cognitive problems. She has been evaluated because of neck pain and so she is going to have an MRI of the cervical spine. Daughter with history of juvenile idiopathic arthritis. 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 No recent falls or devices to ambulate. No previous rheumatological work-up is available. Previous available records/referral and labs were reviewed.       REVIEW OF SYSTEMS:    Constitutional: denies fever/chills, night sweats, unintentional weight loss  Integumentary: denies photosensitivity, rash, patchy alopecia, or Sx of Raynaud's phenomenon  Eyes: denies dry eyes, redness or pain, visual disturbance, or floaters  Ears: denies hearing loss, tinnitus, vertigo, or recurrent ear infections  Nose: denies nasal ulcers or recurrent sinusitis  Oral cavity: denies dry mouth or oral ulcers  Cardiovascular: denies CP, palpitations, Hx of pericardial effusion or pericarditis  Respiratory: denies SOB, cough, hemoptysis, or pleurisy  Gastrointestinal: denies heart burn, dysphagia or esophageal dysmotility, denies change in bowel habits or Sx of IBD  Genitourinary: denies change in urine amount or urine appearance, denies frothy urine or Hx of nephrolithiasis  Hematologic/Lymphatic: denies abnormal bruising or bleeding, denies Hx of blood clots or recurrent miscarriages, denies swollen LNs  Musculoskeletal:  refer to above HPI   Neurological: denies focal weakness, paresthesias/hyperesthesias or change in sensation, denies Hx of seizure, denies change in gait, balance, or memory  Psychiatric: denies Hx of depression or anxiety  Endocrine: denies cold or heat intolerance  Allergic/Immunologic: denies nasal congestion, chronic asthma, or hives    Past Medical History:   Diagnosis Date    Abnormal Pap smear of cervix     Chronic kidney disease     Fibromyalgia     Herpes simplex virus (HSV) infection     Migraines     Rheumatoid arthritis ([facility] [address]     Smoker         Past Surgical History:   Procedure Laterality Date    FINGER TRIGGER RELEASE Right     ring finger    FINGER TRIGGER RELEASE  2017    HAND SURGERY      RIGHT    HAND SURGERY      right       Social History     Socioeconomic History    Marital status:      Spouse name: Not on file    Number of children: Not on file    Years of education: Not on file    Highest education level: Not on file   Occupational History    Not on file   Social Needs    Financial resource strain: Not on file    Food insecurity     Worry: Not on file     Inability: Not on file    Transportation needs     Medical: Not on file     Non-medical: Not on file   Tobacco Use    Smoking status: Current Every Day Smoker     Packs/day: 0.25    Smokeless tobacco: Never Used   Substance and Sexual Activity    Alcohol use: No    Drug use: No    Sexual activity: Yes     Partners: Male   Lifestyle    Physical activity     Days per week: Not on file     Minutes per session: Not on file    Stress: Not on file   Relationships    Social connections     Talks on phone: Not on file     Gets together: Not on file     Attends Uatsdin service: Not on file     Active member of club or organization: Not on file     Attends meetings of clubs or organizations: Not on file     Relationship status: Not on file    Intimate partner violence     Fear of current or ex partner: Not on file     Emotionally abused: Not on file     Physically abused: Not on file     Forced sexual activity: Not on file   Other Topics Concern    Not on file   Social History Narrative    Not on file        Family History   Problem Relation Age of Onset    Stroke Father     No Known Problems Mother     No Known Problems Brother     No Known Problems Sister        MEDICATIONS:    Current Outpatient Medications:     methylPREDNISolone (MEDROL DOSEPACK) 4 MG tablet, Take by mouth as directed., Disp: 21 kit, Rfl: 0    diclofenac (VOLTAREN) 75 MG EC tablet, Take 1 tablet by mouth 2 times daily (Patient not taking: Reported on 7/22/2020), Disp: 60 tablet, Rfl: 3    ALLERGIES:  Clindamycin    PHYSICAL EXAM:    Vitals:    /84 (Site: Right Upper Arm, Position: Sitting, Cuff Size: Large Adult)   Pulse 85   Temp 98 °F (36.7 °C)   Ht 5' 2\" (1.575 m)   Wt 194 lb (88 kg)   LMP 07/14/2020   SpO2 98%   BMI 35.48 kg/m²     GEN: AAOx3, in NAD, well-appearing  HEAD: normocephalic, atraumatic  EYES: EOMI, PERRLA, no injection or icterus  NOSE: no nasal ulcers or nasal drainage  ORAL CAVITY: moist oral 7/22/2021     Outpatient Encounter Medications as of 7/22/2020   Medication Sig Dispense Refill    methylPREDNISolone (MEDROL DOSEPACK) 4 MG tablet Take by mouth as directed. 21 kit 0    diclofenac (VOLTAREN) 75 MG EC tablet Take 1 tablet by mouth 2 times daily (Patient not taking: Reported on 7/22/2020) 60 tablet 3     No facility-administered encounter medications on file as of 7/22/2020. Return 3 weeks. 7/22/2020 1:56 PM      Sending consult note to referring provider Braden Castillo DO. Thank you very much for allowing me to participate in this pt's care. Please do not hesitate to contact me if I can be of further assistance. NOTE: This report is transcribed by using voice recognition software dragon. Every effort is made to ensure accuracy; however, inadvertent computerized transcription errors may be present.            Payal Cox MD

## 2020-07-22 NOTE — PATIENT INSTRUCTIONS
Patient Education        Fibromyalgia: Care Instructions  Overview     Fibromyalgia is a painful condition that is not completely understood by medical experts. The cause of fibromyalgia is not known. It can make you feel tired and ache all over. It causes tender spots at specific points of the body that hurt only when you press on them. You may have trouble sleeping, as well as other symptoms. These problems can upset your work and home life. Symptoms tend to come and go, although they may never go away completely. Fibromyalgia does not harm your muscles, joints, or organs. Follow-up care is a key part of your treatment and safety. Be sure to make and go to all appointments, and call your doctor if you are having problems. It's also a good idea to know your test results and keep a list of the medicines you take. How can you care for yourself at home? · Exercise often. Walk, swim, or bike to help with pain and sleep problems and to make you feel better. · Try to get a good night's sleep. Go to bed and get up at the same time each day, whether you feel rested or not. Make sure you have a good mattress and pillow. · Reduce stress. Avoid things that cause you stress, if you can. If not, work at making them less stressful. Learn to use biofeedback, guided imagery, meditation, or other methods to relax. · Make healthy changes. Eat a balanced diet, quit smoking, and limit alcohol and caffeine. · Use a heating pad set on low or take warm baths or showers for pain. Using cold packs for up to 20 minutes at a time can also relieve pain. Put a thin cloth between the cold pack and your skin. A gentle massage might help too. · Be safe with medicines. Take your medicines exactly as prescribed. Call your doctor if you think you are having a problem with your medicine. Your doctor may talk to you about taking antidepressant medicines.  These medicines may improve sleep, relieve pain, and in some cases treat depression. · Learn about fibromyalgia. This makes coping easier. Then, take an active role in your treatment. · Think about joining a support group with others who have fibromyalgia to learn more and get support. When should you call for help? Watch closely for changes in your health, and be sure to contact your doctor if:  · You feel sad, helpless, or hopeless; lose interest in things you used to enjoy; or have other symptoms of depression. · Your fibromyalgia symptoms get worse. Where can you learn more? Go to [email or web address] and sign in to your [employer] account. Enter V003 in the FuelMyBlog box to learn more about \"Fibromyalgia: Care Instructions. \"     If you do not have an account, please click on the \"Sign Up Now\" link. Current as of: November 20, 2019               Content Version: 12.5  © 8479-2147 Healthwise, Incorporated. Care instructions adapted under license by [facility] ([facility]). If you have questions about a medical condition or this instruction, always ask your healthcare professional. [address] any warranty or liability for your use of this information.

## 2020-07-22 NOTE — PROGRESS NOTES
standing up or bending down  and improves with resting. Radiates to the left mid thigh. No signs of cauda equina such as urinary or fecal incontinence as saddle anesthesia. No previous imaging. Physical therapy did not provide significant pain relief. Never aqua therapy, epidural injections, spinal stimulator placement or surgery. Currently on    Never tried physical therapy or aqua therapy. No family history of autoimmune conditions. Employment    No recent falls or devices to ambulate. Previous available records/referral and labs were reviewed.       REVIEW OF SYSTEMS:    Constitutional: denies chronic fatigue, fever/chills, night sweats, unintentional weight loss  Integumentary: denies photosensitivity, rash, patchy alopecia, or Sx of Raynaud's phenomenon  Eyes: denies dry eyes, redness or pain, visual disturbance, or floaters  Ears: denies hearing loss, tinnitus, vertigo, or recurrent ear infections  Nose: denies nasal ulcers or recurrent sinusitis  Oral cavity: denies dry mouth or oral ulcers  Cardiovascular: denies CP, palpitations, Hx of pericardial effusion or pericarditis  Respiratory: denies SOB, cough, hemoptysis, or pleurisy  Gastrointestinal: denies heart burn, dysphagia or esophageal dysmotility, denies change in bowel habits or Sx of IBD  Genitourinary: denies change in urine amount or urine appearance, denies frothy urine or Hx of nephrolithiasis  Hematologic/Lymphatic: denies abnormal bruising or bleeding, denies Hx of blood clots or recurrent miscarriages, denies swollen LNs  Musculoskeletal:  refer to above HPI   Neurological: denies focal weakness, paresthesias/hyperesthesias or change in sensation, denies Hx of seizure, denies change in gait, balance, or memory  Psychiatric: denies Hx of depression or anxiety  Endocrine: denies cold or heat intolerance  Allergic/Immunologic: denies nasal congestion, chronic asthma, or hives    Past Medical History:   Diagnosis Date    Abnormal Pap smear of cervix     Chronic kidney disease     Fibromyalgia     Herpes simplex virus (HSV) infection     Migraines     Rheumatoid arthritis ([facility] [address]     Smoker         Past Surgical History:   Procedure Laterality Date    FINGER TRIGGER RELEASE Right     ring finger    FINGER TRIGGER RELEASE  2017    HAND SURGERY      RIGHT    HAND SURGERY      right       Social History     Socioeconomic History    Marital status:      Spouse name: Not on file    Number of children: Not on file    Years of education: Not on file    Highest education level: Not on file   Occupational History    Not on file   Social Needs    Financial resource strain: Not on file    Food insecurity     Worry: Not on file     Inability: Not on file    Transportation needs     Medical: Not on file     Non-medical: Not on file   Tobacco Use    Smoking status: Current Every Day Smoker     Packs/day: 0.25    Smokeless tobacco: Never Used   Substance and Sexual Activity    Alcohol use: No    Drug use: No    Sexual activity: Yes     Partners: Male   Lifestyle    Physical activity     Days per week: Not on file     Minutes per session: Not on file    Stress: Not on file   Relationships    Social connections     Talks on phone: Not on file     Gets together: Not on file     Attends Gnosticism service: Not on file     Active member of club or organization: Not on file     Attends meetings of clubs or organizations: Not on file     Relationship status: Not on file    Intimate partner violence     Fear of current or ex partner: Not on file     Emotionally abused: Not on file     Physically abused: Not on file     Forced sexual activity: Not on file   Other Topics Concern    Not on file   Social History Narrative    Not on file        Family History   Problem Relation Age of Onset    Stroke Father     No Known Problems Mother     No Known Problems Brother     No Known Problems Sister        MEDICATIONS:    Current Outpatient Medications:     methylPREDNISolone (MEDROL DOSEPACK) 4 MG tablet, Take by mouth as directed., Disp: 21 kit, Rfl: 0    diclofenac (VOLTAREN) 75 MG EC tablet, Take 1 tablet by mouth 2 times daily (Patient not taking: Reported on 7/22/2020), Disp: 60 tablet, Rfl: 3    ALLERGIES:  Clindamycin    PHYSICAL EXAM:    Vitals:    /84 (Site: Right Upper Arm, Position: Sitting, Cuff Size: Large Adult)   Pulse 85   Temp 98 °F (36.7 °C)   Ht 5' 2\" (1.575 m)   Wt 194 lb (88 kg)   LMP 07/14/2020   SpO2 98%   BMI 35.48 kg/m²     GEN: AAOx3, in NAD, well-appearing  HEAD: normocephalic, atraumatic  EYES: EOMI, PERRLA, no injection or icterus  NOSE: no nasal ulcers or nasal drainage  ORAL CAVITY: moist oral mucosa w/ good saliva pooling, no oral lesions, no evidence of thrush, no evidence of parotid gland enlargement  NECK: supple w/ FROM, of evidence of lymphadenopathy  CVS: RRR, no murmurs rubs or gallops, no JVD, 2+ distal pulses b/l  LUNGS: in no acute respiratory distress, CTAB  ABDOMEN: +BS, soft, NT/ND, no evidence of organomegaly  LYMPH: no cervical, supraclavicular or axillary LAD  MSK:  Spine: no kyphosis or lordosis, axial spine w/ FROM, no paraspinal muscle or vertebral tenderness, SI joints NTTP  Upper extremities:   Hands: no synovitis in the MCP, PIP, or DIP joints b/l, no dactylitis, able to make strong full fists   Wrist: no synovitis in the wrist joints b/l, FROM   Elbow: no synovitis or bursitis, FROM   Shoulders: no pain or swelling or warmth on palpation, FROM  Lower extremities:   Hip: normal log roll, negative CORRY test b/l, trochanteric bursa NTTP b/l   Knees: no warmth or effusion present, FROM   Ankles: no synovitis, FROM, Achilles tendons w/o swelling or warmth NTTP   Feet: no toe swelling or pain or warmth on palpation w/ FROM, negative MTP squeeze test  NEURO: CN II-XII grossly intact intact, face appears symmetrical, normal DTR, no evidence of muscle atrophy, 5/5 strength proximally and distally throughout in both upper and lower extremities, touch sensation grossly intact  INTEGUMENTARY: no rash or psoriatic lesions, no petechiae, bruises, or palpable purpura, no patchy alopecia, no nail or periungual changes, no clubbing or digital ulcers  PSYCH: normal mood    Labs: I personally reviewed prior labs including:    Lab Results   Component Value Date    WBC 23.6 (H) 08/07/2018    HGB 9.3 (L) 08/09/2018    HCT 36.9 08/07/2018    MCV 86.1 08/07/2018     08/07/2018    LYMPHOPCT 22.0 08/07/2018    RBC 4.28 08/07/2018    MCH 28.9 08/07/2018    MCHC 33.5 08/07/2018    RDW 13.2 08/07/2018     No results found for: NA, K, CL, CO2, BUN, CREATININE, GLUCOSE, CALCIUM, PROT, LABALBU, BILITOT, ALKPHOS, AST, ALT, LABGLOM, GFRAA, AGRATIO, GLOB      Radiology:  I personally reviewed prior imaging including:      Procedures:      Above results were discussed w/ the pt in detail during today's visit. ASSESSMENT/PLAN:  28 y.o. p/w   PMHx pertinent for     There are no diagnoses linked to this encounter. No orders of the defined types were placed in this encounter. Outpatient Encounter Medications as of 7/22/2020   Medication Sig Dispense Refill    methylPREDNISolone (MEDROL DOSEPACK) 4 MG tablet Take by mouth as directed. 21 kit 0    diclofenac (VOLTAREN) 75 MG EC tablet Take 1 tablet by mouth 2 times daily (Patient not taking: Reported on 7/22/2020) 60 tablet 3     No facility-administered encounter medications on file as of 7/22/2020. No follow-ups on file. 7/22/2020 1:18 PM      Sending consult note to referring provider Armand Caldera DO. Thank you very much for allowing me to participate in this pt's care. Please do not hesitate to contact me if I can be of further assistance. NOTE: This report is transcribed by using voice recognition software dragon.  Every effort is made to ensure accuracy; however, inadvertent computerized transcription errors may be present.            Lonzie Bonds, MD

## 2020-07-23 ENCOUNTER — TELEPHONE (OUTPATIENT)
Dept: INTERNAL MEDICINE CLINIC | Age: 35
End: 2020-07-23

## 2020-07-23 LAB — SEDIMENTATION RATE, ERYTHROCYTE: 7 MM/HR (ref 0–20)

## 2020-07-23 NOTE — TELEPHONE ENCOUNTER
Patient called back and results were given to her and she did get the Advise test done also. Results will be discussed at next appt.

## 2020-07-23 NOTE — TELEPHONE ENCOUNTER
Micky Verma,    See previous note    Can we call this patient?:  We have the regular labs but not sure if the AVISE was done. Labs done yesterday with a normal ESR and CRP (there is no evidence of inflammation).

## 2020-07-24 LAB — HLA B27: NEGATIVE

## 2020-08-03 ENCOUNTER — OFFICE VISIT (OUTPATIENT)
Dept: ORTHOPEDIC SURGERY | Age: 35
End: 2020-08-03
Payer: MEDICAID

## 2020-08-03 VITALS — WEIGHT: 194 LBS | BODY MASS INDEX: 35.7 KG/M2 | HEIGHT: 62 IN

## 2020-08-03 PROCEDURE — 4004F PT TOBACCO SCREEN RCVD TLK: CPT | Performed by: FAMILY MEDICINE

## 2020-08-03 PROCEDURE — G8417 CALC BMI ABV UP PARAM F/U: HCPCS | Performed by: FAMILY MEDICINE

## 2020-08-03 PROCEDURE — 99214 OFFICE O/P EST MOD 30 MIN: CPT | Performed by: FAMILY MEDICINE

## 2020-08-03 PROCEDURE — G8427 DOCREV CUR MEDS BY ELIG CLIN: HCPCS | Performed by: FAMILY MEDICINE

## 2020-08-03 RX ORDER — CYCLOBENZAPRINE HCL 10 MG
10 TABLET ORAL 3 TIMES DAILY PRN
Qty: 30 TABLET | Refills: 0 | Status: SHIPPED | OUTPATIENT
Start: 2020-08-03 | End: 2021-03-10 | Stop reason: ALTCHOICE

## 2020-08-03 NOTE — PROGRESS NOTES
Chief Complaint  Neck Pain (TR MRI NECK)      Follow-up ongoing cervical pain with suspected low-grade spondylolysis. Review of cervical imaging    History of Present Illness:  Monique Cordero is a 28 y.o. 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 Gregg Salomon, he does have a history of rheumatoid arthritis and started treatment many years ago out in [facility] states that her cervical spine is been bothering her for at least 10+ years which she was attributed to her rheumatoid arthritis. Once again she was previously established from a rheumatologic standpoint however lost her rheumatologist 1 [facility] closed. She was being treated appropriately including trials of methotrexate but was ultimately taken off of this and she has not had any type of rheumatologic treatment for a number of years. She states that her cervical symptoms have worsened substantially over the past several months since roughly April 2020 but is become much worse since early July 2020. There is no history of injury or fall and she does have considerable stiffness and pain to the cervical spine radiating around to the left greater than right sternoclavicular region but more recently has been having numbness and tingling in the left arm in the C7/T1 distribution. There is once again no history of actual injury or new activity prior to becoming symptomatic. She does have pain ranging between a 2 up to about an 8 out of 10 and does seem to be worse at night. She has tried 1 session of medical massage which is helped loosen up her trapezius and she does have some extension to the periscapular region.   Cervical rotation and extension does aggravate appropriate. Vascular: Examination reveals no swelling or calf tenderness. Peripheral pulses are palpable and 2+. Neurological: The patient has good coordination. There is no weakness or sensory deficit. Cervical Spine Examination    Inspection: There is no high-grade deformity soft tissue swelling or palpable spasm. No masses. Palpation: She does have ongoing tenderness along the cervical paraspinals and lower cervical facets with several trapezial trigger points and some tenderness over the superior rhomboid. She does have some sternoclavicular discomfort to palpation mildly left and right side without high-grade deformity. Rang of Motion: She does have reasonable cervical motion. Strength: I do not see definitive evidence of focal upper extremity motor deficits and her cervical isometric strength testing appears intact. Special Tests: She does display some pain with axial load testing. Equivocal Spurling's on the left and negative on the right. Once again no focal upper extremity motor deficits noted. Skin: There are no rashes, ulcerations or lesions. Distal motor sensory and vascular exams intact. Gait: Reasonably fluid gait. Additional Comments:            Additional Examinations:      Right Upper Extremity:  Examination of the right upper extremity does not show any tenderness, deformity or injury. Range of motion is unremarkable. There is no gross instability. There are no rashes, ulcerations or lesions. Strength and tone are normal.  Left Upper Extremity: Examination of the left upper extremity does not show any tenderness, deformity or injury. Range of motion is unremarkable. There is no gross instability. There are no rashes, ulcerations or lesions.   Strength and tone are normal.              Diagnostic Test Findings:    Cervical MRI performed at [facility] on 7/29/2020 as listed above     Narrative    Site: [facility] #: [id number] #: [id number] Procedure: MR Cervical Spine w/o Contrast ; Reason for Exam: Neck pain. Strain of neck muscle, initial encounter. Cervical myofascial pain syndrome. Left arm numbness    This document is confidential medical information.  Unauthorized disclosure or use of this information is prohibited by law. If you are not the intended recipient of this document, please advise us by calling immediately [phone].         [facility] NIKOLE Garciaadebayoksrosemary Chiaar              Patient Name: Carley Bal    Case ID: [id number]    Patient : 1985    Referring Physician: Jeanna Beltrán MD    Exam Date: 2020    Exam Description: MR Cervical Spine w/o Contrast              HISTORY:   Neck pain.  Pain medial right clavicle.         TECHNICAL FACTORS:  Long- and short-axis fat- and water-weighted images were performed.         COMPARISON:  None.         FINDINGS:   No prevertebral soft tissue swelling or fracture.  Straightening of spine    presumably represents muscle spasm.         The craniocervical junction and cervical cord normal.         Minor spondylosis without substantial disc space narrowing.         Findings at individual levels demonstrate:         C2-3, C3-4, C4-5 unremarkable.         C5-6 shallow disc osteophyte complex with no neural compression.         C6-7 shallow central disc osteophyte complex with no neural compression.         C7-T1 unremarkable.         Visualized soft tissues of the neck normal.         CONCLUSION:    Muscle spasm and minor spondylosis.  No compressive abnormality in the cervical spine.         Thank you for the opportunity to provide your interpretation.                   Bruce Sinha MD         A: [facility] 2020 4:42 PM                 Assessment: #1. Progressively worsening cervical pain with cervical myofascial pain with low-grade cervical spondylolysis and no evidence of compressive discopathy. #2.   Newer onset left upper

## 2020-08-04 ENCOUNTER — OFFICE VISIT (OUTPATIENT)
Dept: ORTHOPEDIC SURGERY | Age: 35
End: 2020-08-04
Payer: MEDICAID

## 2020-08-04 PROCEDURE — 95909 NRV CNDJ TST 5-6 STUDIES: CPT | Performed by: PHYSICAL MEDICINE & REHABILITATION

## 2020-08-04 PROCEDURE — 95886 MUSC TEST DONE W/N TEST COMP: CPT | Performed by: PHYSICAL MEDICINE & REHABILITATION

## 2020-08-04 NOTE — PROGRESS NOTES
[address] MEDICINE      Patient: Melissa Connelly Age: 28 Years 2 Months  Sex: Female Date: 8/4/2020  YOB: 1985 Ref. Phys.: Dr Selina Mcnair  Notes:  left radiating arm pain      Sensory NCS      Nerve / Sites Peak PeakAmp Dist Jesús    ms µV cm m/s   L MEDIAN - D2 ULNAR D5   1. Median Wrist 3.55 28.4 14 50.9   2. Ulnar Wrist 3.10 30.7 14 59.6   L MEDIAN - RADIAL THUMB   1. Median Wrist       2. Radial Wrist 1.95 31.9 10 69.0       Motor NCS      Nerve / Sites Lat Amp Amp Dist Jesús    ms mV % cm m/s   L MEDIAN - APB   1. Wrist 3.85 7.1 100 8    2. Elbow 7.35 7.6 108 22 62.9   L ULNAR - ADM   1. Wrist 2.50 10.6 100 8    2. B. Elbow 5.20 9.7 91.4 19 70.4   3. A. Elbow 6.65 9.6 90.5 10 69.0       EMG Summary Table     Spontaneous MUAP Recruit. Ins. Act Fibs. PSW Fasics. H.F. Amp. Dur. Poly's. Pattern   L. FIRST D INTEROSS N None None None None N N N N   L. BICEPS N None None None None N N N N   L. TRICEPS N None None None None N N N N   L. EXT CARPI R BREV N None None None None N N N N   L. EXT DIG COMM N None None None None N N N N   L. PRON TERES N None None None None N N N N   L. CERV PSP (L) N None None None None N N N N       Summary: Nerve conduction studies and monopolar exam of the left upper extremity are normal.      Impression: Normal examination.      1. No evidence of a left upper extremity mononeuropathy, plexopathy, or radiculopathy            Sylvie Patel MD

## 2020-08-05 ENCOUNTER — OFFICE VISIT (OUTPATIENT)
Dept: ORTHOPEDIC SURGERY | Age: 35
End: 2020-08-05
Payer: MEDICAID

## 2020-08-05 VITALS — WEIGHT: 194 LBS | BODY MASS INDEX: 35.7 KG/M2 | HEIGHT: 62 IN

## 2020-08-05 PROCEDURE — G8427 DOCREV CUR MEDS BY ELIG CLIN: HCPCS | Performed by: FAMILY MEDICINE

## 2020-08-05 PROCEDURE — 99214 OFFICE O/P EST MOD 30 MIN: CPT | Performed by: FAMILY MEDICINE

## 2020-08-05 PROCEDURE — G8417 CALC BMI ABV UP PARAM F/U: HCPCS | Performed by: FAMILY MEDICINE

## 2020-08-05 PROCEDURE — 4004F PT TOBACCO SCREEN RCVD TLK: CPT | Performed by: FAMILY MEDICINE

## 2020-08-05 PROCEDURE — L3908 WHO COCK-UP NONMOLDE PRE OTS: HCPCS | Performed by: FAMILY MEDICINE

## 2020-08-05 NOTE — PROGRESS NOTES
Chief Complaint  Follow-up (EMG review )      Follow-up ongoing cervical pain with low-grade spondylolysis and noncompressive disc protrusions C5-6 and C6-7 with spondylolysis. Review of left upper extremity gene testing    History of Present Illness:  Ralph Peña is a 28 y.o. 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 Angelo Root, he does have a history of rheumatoid arthritis and started treatment many years ago out in [facility] states that her cervical spine is been bothering her for at least 10+ years which she was attributed to her rheumatoid arthritis. Once again she was previously established from a rheumatologic standpoint however lost her rheumatologist [facility] [place] closed. She was being treated appropriately including trials of methotrexate but was ultimately taken off of this and she has not had any type of rheumatologic treatment for a number of years. She states that her cervical symptoms have worsened substantially over the past several months since roughly April 2020 but is become much worse since early July 2020. There is no history of injury or fall and she does have considerable stiffness and pain to the cervical spine radiating around to the left greater than right sternoclavicular region but more recently has been having numbness and tingling in the left arm in the C7/T1 distribution. There is once again no history of actual injury or new activity prior to becoming symptomatic. She does have pain ranging between a 2 up to about an 8 out of 10 and does seem to be worse at night.   She has tried 1 session of medical massage which is helped loosen up her trapezius and she does have some extension to the periscapular region. Cervical rotation and extension does aggravate her symptoms as does housework and yard work. She has tried alternating Motrin and Tylenol with limited success. She has noticed possibly some weakness into her hand as well as numbness and tingling. She has noted that she will have to shake her hands awake in the morning and with driving. She is being seen today for orthopedic and sports consultation with imaging. She was initially evaluated in office on 7/20/2020 and given her exam findings and chronicity of her symptoms, we did send her for cervical MRI which was obtained at [facility] on 7/29/2020 and did show evidence of straightening about the cervical spine consistent with muscle spasm and some low-grade spondylolysis and noncompressive disc protrusions at C5-6 and C6-7 with spondylitic change. There is not appear to be high-grade foraminal narrowing or high-grade disc herniation. We had held off formal therapy pending the results of her MRI and she is actually set up for her EMGs with Dr. Evelyn Park of her upper extremities tomorrow on 8/4/2020. She did not get much in the way of benefit from her Medrol pack but has tolerated her diclofenac. She is feeling about the same as her initial evaluation a couple weeks ago. Denies focal weakness. She has most of her discomfort at night. She actually has seen Dr. Deanna Ascencio and is in the process of her rheumatologic work-up once again. She was last seen in the office on 8/3/2020 reviewed her cervical MRI. She was continued on conservative treatment and did have her follow-up EMG of the left upper extremity on 8/4/2020. This did not show any evidence of carpal tunnel or obvious signs of plexopathy or radiculopathy. She once again is under the care of Dr. Indio Ibrahim and is being worked up for her rheumatologic conditions as well. She is continue with her anti-inflammatories. She has not tried cock-up splints.   She is upper extremity motor deficits noted. She does appear to have a positive Tinel's at the carpal tunnel left greater than right with negative cubital tunnel Tinel's and no evidence of thenar atrophy. Skin: There are no rashes, ulcerations or lesions. Distal motor sensory and vascular exams intact. Gait: Reasonably fluid gait. Additional Comments:            Additional Examinations:      Right Upper Extremity:  Examination of the right upper extremity does not show any tenderness, deformity or injury. Range of motion is unremarkable. There is no gross instability. There are no rashes, ulcerations or lesions. Strength and tone are normal.  Left Upper Extremity: Examination of the left upper extremity does not show any tenderness, deformity or injury. Range of motion is unremarkable. There is no gross instability. There are no rashes, ulcerations or lesions. Strength and tone are normal.              Diagnostic Test Findings:    Cervical MRI performed at [address] [place] [address] on 2020 as listed above     Narrative    Site: [facility] #: [id number] #: [id number] Procedure: MR Cervical Spine w/o Contrast ; Reason for Exam: Neck pain. Strain of neck muscle, initial encounter. Cervical myofascial pain syndrome. Left arm numbness    This document is confidential medical information.  Unauthorized disclosure or use of this information is prohibited by law. If you are not the intended recipient of this document, please advise us by calling immediately [phone].         [facility]    [address]              Patient Name: Melissa Connelly    Case ID: [id number]    Patient : 1985    Referring Physician: Nayan Pace MD    Exam Date: 2020    Exam Description: MR Cervical Spine w/o Contrast              HISTORY:   Neck pain.  Pain medial right clavicle.         TECHNICAL FACTORS:  Long- and short-axis fat- and water-weighted images were performed.         COMPARISON:  None.         FINDINGS:   No prevertebral soft tissue swelling or fracture.  Straightening of spine    presumably represents muscle spasm.         The craniocervical junction and cervical cord normal.         Minor spondylosis without substantial disc space narrowing.         Findings at individual levels demonstrate:         C2-3, C3-4, C4-5 unremarkable.         C5-6 shallow disc osteophyte complex with no neural compression.         C6-7 shallow central disc osteophyte complex with no neural compression.         C7-T1 unremarkable.         Visualized soft tissues of the neck normal.         CONCLUSION:    Muscle spasm and minor spondylosis.  No compressive abnormality in the cervical spine.         Thank you for the opportunity to provide your interpretation.                   Aron Wilder MD         A: PC 2020 4:42 PM           Negative left upper extremity EMG per Dr. Lawrnce Apgar 2020    Assessment: #1. Persistent symptomatic cervical pain with cervical myofascial pain with low-grade cervical spondylolysis and no evidence of compressive discopathy. #2. Newer onset left upper extremity numbness and tingling with negative left upper extremity EMG 2020 ##. Chronic effectively untreated rheumatoid arthritis currently seeing Dr. Cy Paredes:       Encounter Diagnoses   Name Primary?  Neck pain Yes    Strain of neck muscle, initial encounter     Cervical myofascial pain syndrome     Left arm numbness        Office Procedures:       Orders Placed This Encounter   Procedures    Who cock-up nonmolde pre ots     Patient was prescribed a Meghan Isaac Titan Wrist Brace. The left hand will require stabilization / immobilization from this semi-rigid / rigid orthosis to improve their function. The orthosis will assist in protecting the affected area, provide functional support and facilitate healing.     The patient was educated and fit by a healthcare professional with expert knowledge and specialization in brace application while under the direct supervision of the physician. Verbal and written instructions for the use of and application of this item were provided. They were instructed to contact the office immediately should the brace result in increased pain, decreased sensation, increased swelling or worsening of the condition. Treatment Plan:  Treatment options were discussed with Manish Reaves. We did review her plain films, recent cervical MRI and exam findings as well as her recent negative left upper extremity EMG. She has been increasingly symptomatic with multiple joint arthralgias as well as worsening cervical pain and newer onset left upper extremity numbness and tingling over the past several months. Her cervical MRI was obtained on 7/29/2020 and shows noncompressive disc protrusions and evidence of some mild spondylitic changes. There is no evidence of high-grade foraminal narrowing or neural compression. She did have a negative left upper extremity EMG for radiculopathy or mononeuropathy on 8/4/2020 with Dr. Micaela Valente. This could represent an EMG negative carpal tunnel phenomenon we did give her a cock-up splint on the left to start with. We can always get her one on the right although it was not as consistent on the right. She is encouraged to start physical therapy on 8/7/2020. She will continue with her diclofenac 75 mg 1 pill twice daily and has improved with adding Flexeril 10 mg p.o. nightly. We will see her back for her cervical and left arm symptoms in 4 to 6 weeks and she will continue with her rheumatologic work-up with Dr. Jelly Moreno. She will contact us in interim with questions or concerns. This dictation was performed with a verbal recognition program (DRAGON) and it was checked for errors. It is possible that there are still dictated errors within this office note.  If so, please bring any errors to my attention for an addendum. All efforts were made to ensure that this office note is accurate.

## 2020-08-07 ENCOUNTER — HOSPITAL ENCOUNTER (OUTPATIENT)
Dept: PHYSICAL THERAPY | Age: 35
Setting detail: THERAPIES SERIES
Discharge: HOME | End: 2020-08-07
Payer: MEDICAID

## 2020-08-07 PROCEDURE — 97140 MANUAL THERAPY 1/> REGIONS: CPT

## 2020-08-07 PROCEDURE — 97110 THERAPEUTIC EXERCISES: CPT

## 2020-08-07 PROCEDURE — 97161 PT EVAL LOW COMPLEX 20 MIN: CPT

## 2020-08-07 NOTE — FLOWSHEET NOTE
RA, potential ankylosing spondylitis     Exercises/Interventions:   Therapeutic Ex Sets Reps Notes HEP   Posture ed       Supine/seated chin tuck 2 10  X   UT/levator stretch 3 30\"  X   SBS  10             Manual Intervention       Cerv mobs/manip 10 min      Thoracic mobs/manip       CT manip       Rib mobilizations       STM                     NMR re-education       T-spine Ext- foam roll       Chin tucks                             Traction                         Therapeutic Exercise and NMR EXR  [x] (74136) Provided verbal/tactile cueing for activities related to strengthening, flexibility, endurance, ROM  for improvements in cervical, postural, scapular, scapulothoracic and UE control with self care, reaching, carrying, lifting, house/yardwork, driving/computer work.    [] (90997) Provided verbal/tactile cueing for activities related to improving balance, coordination, kinesthetic sense, posture, motor skill, proprioception  to assist with cervical, scapular, scapulothoracic and UE control with self care, reaching, carrying, lifting, house/yardwork, driving/computer work. Therapeutic Activities:    [] (47650 or 72659) Provided verbal/tactile cueing for activities related to improving balance, coordination, kinesthetic sense, posture, motor skill, proprioception and motor activation to allow for proper function of cervical, scapular, scapulothoracic and UE control with self care, carrying, lifting, driving/computer work.      Home Exercise Program:    [x] (31908) Reviewed/Progressed HEP activities related to strengthening, flexibility, endurance, ROM of cervical, scapular, scapulothoracic and UE control with self care, reaching, carrying, lifting, house/yardwork, driving/computer work  [] (40397) Reviewed/Progressed HEP activities related to improving balance, coordination, kinesthetic sense, posture, motor skill, proprioception of cervical, scapular, scapulothoracic and UE control with self care, reaching, carrying, lifting, house/yardwork, driving/computer work      Manual Treatments:  PROM / STM / Oscillations-Mobs:  G-I, II, III, IV (PA's, Inf., Post.)  [x] (01270) Provided manual therapy to mobilize soft tissue/joints of cervical/CT, scapular GHJ and UE for the purpose of decreasing headache, modulating pain, promoting relaxation,  increasing ROM, reducing/eliminating soft tissue swelling/inflammation/restriction, improving soft tissue extensibility and allowing for proper ROM for normal function with self care, reaching, carrying, lifting, house/yardwork, driving/computer work    Modalities:     [] GR/ESU    [] GR    [] ESU     [] CP    [] MHP    [x] declined    Charges:  Timed Code Treatment Minutes: 30   Total Treatment Minutes: 70     [x] EVAL (LOW) 36880 (typically 20 minutes face-to-face)  [] EVAL (MOD) 03904 (typically 30 minutes face-to-face)  [] EVAL (HIGH) 59684 (typically 45 minutes face-to-face)  [] RE-EVAL     [x] CM(26506) x 1    [] IONTO  [] NMR (87753) x     [] VASO  [x] Manual (88272) x 1     [] Other:  [] TA x      [] Mech Traction (84340)  [] ES(attended) (57770)      [] ES (un) (99595):     GOALS: Patient stated goal: Get back to walking, caring for children with less pain. [] Progressing: [] Met: [] Not Met: [] Adjusted    Therapist goals for Patient:   Short Term Goals: To be achieved in: 2 weeks  1. Independent in HEP and progression per patient tolerance, in order to prevent re-injury. [] Progressing: [] Met: [] Not Met: [] Adjusted  2. Patient will have a decrease in pain to facilitate improvement in movement, function, and ADLs as indicated by Functional Deficits. [] Progressing: [] Met: [] Not Met: [] Adjusted    Long Term Goals: To be achieved in: 12 weeks  1. Disability index score of 30% or less for the NDI to assist with reaching prior level of function. [] Progressing: [] Met: [] Not Met: [] Adjusted  2.  Patient will demonstrate increased AROM to [facility] of cervical/thoracic spine

## 2020-08-07 NOTE — PLAN OF CARE
[facility], [address], [address] [place] [address]  Phone: [phone]  Fax [phone]   Physical Therapy Certification    Dear Referring Practitioner: Dr. Vianey Perez,    We had the pleasure of evaluating the following patient for physical therapy services at [address] [place] [address]. A summary of our findings can be found in the initial assessment below. This includes our plan of care. If you have any questions or concerns regarding these findings, please do not hesitate to contact me at the office phone number checked above. Thank you for the referral.       Physician Signature:_______________________________Date:__________________  By signing above (or electronic signature), therapists plan is approved by physician    Patient: Megan Cheng   : 1985   MRN: [id number]  Referring Physician: Referring Practitioner: Dr. Vianey Perez      Evaluation Date: 2020      Medical Diagnosis Information:  Diagnosis: S16.1XX (ICD-10-CM) - Strain of neck muscle; M54.2 (ICD-10-CM) - Neck pain   Treatment Diagnosis: Cervical pain M24.2,  Strain of neck muscle  S16. 1XX                                      Insurance information: PT Insurance Information: 2020 [employer] - $0CP - $0DED - 30PT - 100% - AUTH AFTER 30V    Precautions/ Contra-indications: RA, potential ankylosing spondylosis  Latex Allergy:  [x]NO      []YES  Preferred Language for Healthcare:   [x]English       []other:    SUBJECTIVE: Patient stated complaint: Is having muscle spasms throughout neck, causing some pain throughout collarbone area     Rib and arm symptoms only on left not on right, in neck both sides  Did have torticollis about 10 years ago. Worse in the mornings laying down at night does increase symptoms. Hx of costochondritis, rheumatoid arthritis - testing for ankylosing spondylitis.       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     Living Status/Prior Level of Function: Independent with ADLs and IADLs, walking    OBJECTIVE:     CERV ROM L R   Cervical Flexion 50    Cervical Extension 40    Cervical SB 45 40   Cervical rotation 2/3 1/3        ROM Left Right   Shoulder Flex 150 170   Shoulder Abd     Shoulder ER     Shoulder IR                 STANDING EXAM Normal Abnormal N/A Comments   [facility]                       SEATED EXAM       Dermatomes Normal Abnormal N/A Comment   Posterior aspect of head (C2)       Posterior aspect of neck (C3)  X  Diminished on left   AC jt (C4)       Lateral arm (C5)  X  Diminished on left   Lateral forearm and palmar tip (C6)  X  Diminished on left   Palmar distal phalynx middle finger (C7)  X  Diminished on left   Palmar distal phalynx little finger (C8)  X  Diminished on left   Medial forearm (T1)  X  Diminished on left   Medial arm (T2)  X  Diminished on left   Myotomes Normal Abnormal N/A Comments   Cervical rotation (C1)    Weak on left throughout   Shoulder shrug (C2,3,4)       Shoulder abduction(C5)       Elbow flexion (C5,6)       Elbow extension (C7)       Thumb abduction (C8)       Little finger abduction (T1)       Finger adduction (T1)       Reflexes Normal Abnormal N/A Comments   C5-6 Biceps       C6 Brachioradialis       C7-8 Triceps       Clonus (>3 beats is +)       Babinski        Mulligan        Jaw Jerk        Pectoralis       Diana         SUPINE EXAM Normal Abnormal N/A Comments   Modified shear test       C2 spinous process kick       Tectorial membrane       Distraction       Vertebral artery test                       Joint mobility:    []Normal    [x]Hypo   []Hyper    Palpation: TTP and hypomobile throughout cervical spine    Functional Mobility/Transfers: independent     Posture: Forward head, rounded shoulders     Bandages/Dressings/Incisions: NA    Gait: (include devices/WB status): WNL                        [x] Patient history, allergies, meds reviewed. Medical chart reviewed. See intake form. Review Of Systems (ROS):  [x]Performed Review of systems (Integumentary, CardioPulmonary, Neurological) by intake and observation. Intake form has been scanned into medical record. Patient has been instructed to contact their primary care physician regarding ROS issues if not already being addressed at this time.       Co-morbidities/Complexities (which will affect course of rehabilitation):   []None           Arthritic conditions   []Rheumatoid arthritis (M05.9)  [x]Osteoarthritis (M19.91)   Cardiovascular conditions   [x]Hypertension (I10)  []Hyperlipidemia (E78.5)  []Angina pectoris (I20)  []Atherosclerosis (I70)  []CVA Musculoskeletal conditions   []Disc pathology   []Congenital spine pathologies   []Prior surgical intervention  []Osteoporosis (M81.8)  []Osteopenia (M85.8)   Endocrine conditions   []Hypothyroid (E03.9)  []Hyperthyroid Gastrointestinal conditions   []Constipation (V55.59)   Metabolic conditions   []Morbid obesity (E66.01)  []Diabetes type 1(E10.65) or 2 (E11.65)   []Neuropathy (G60.9)     Pulmonary conditions   []Asthma (J45)  []Coughing   []COPD (J44.9)   Psychological Disorders  [x]Anxiety (F41.9)  [x]Depression (F32.9)   []Other:   []Other:          Barriers to/and or personal factors that will affect rehab potential:              []Age  []Sex   []Smoker              []Motivation/Lack of Motivation                        []Co-Morbidities              []Cognitive Function, education/learning barriers              []Environmental, home barriers              []profession/work barriers  []past PT/medical experience  []other:  Justification:     Falls Risk Assessment (30 days):   [x] Falls Risk assessed and no intervention required. [] Falls Risk assessed and Patient requires intervention due to being higher risk   TUG score (>12s at risk):     [] Falls education provided, including       ASSESSMENT:    Functional Impairments:     [x]Noted cervical/thoracic/GHJ joint hypomobility   []Noted cervical/thoracic/GHJ joint hypermobility   [x]Decreased cervical/UE functional ROM   [x]Noted Headache pain aggravated by neck movements with/without dizziness   [x]Abnormal reflexes/sensation/myotomal/dermatomal deficits   [x]Decreased DCF control or ability to hold head up   [x]Decreased RC/scapular/core strength and neuromuscular control    [x]Decreased UE functional strength   []other:      Functional Activity Limitations (from functional questionnaire and intake)   [x]Reduced ability to tolerate prolonged functional positions   [x]Reduced ability or difficulty with changes of positions or transfers between positions   [x]Reduced ability to maintain good posture and demonstrate good body mechanics with sitting, bending, and lifting   [x] Reduced ability or tolerance with driving and/or computer work   [x]Reduced ability to perform lifting, reaching, carrying tasks   [x]Reduced ability to concentrate   [x]Reduced ability to sleep    [x]Reduced ability to tolerate any impact through UE or spine   [x]Reduced ability to ambulate prolonged functional periods/distances   []other:    Participation Restrictions   [x]Reduced participation in self care activities   [x]Reduced participation in home management activities   [x]Reduced participation in work activities   [x]Reduced participation in social activities. [x]Reduced participation in sport/recreational activities.     Classification/Subgrouping:   [x]signs/symptoms consistent with neck pain with mobility deficits     []signs/symptoms consistent with neck pain with movement coordinated impairments    []signs/symptoms consistent with neck pain with radiating pain    [x]signs/symptoms consistent with neck pain with headaches (cervicogenic)    []Signs/symptoms consistent with nerve root involvement including myotome & dermatome dysfunction   []sign/symptoms consistent with facet dysfunction of cervical and thoracic spine    []signs/symptoms consistent suggesting central cord compression/UMN syndromes   []signs/symptoms consistent with discogenic cervical pain   []signs/symptoms consistent with rib dysfunction   [x]signs/symptoms consistent with postural dysfunction   []signs/symptoms consistent with shoulder pathology    []signs/symptoms consistent with post-surgical status including decreased ROM, strength and function.    [x]signs/symptoms consistent with pathology which may benefit from Dry Needling   []signs/symptoms which may limit the use of advanced manual therapy techniques: (Elevated CV risk profile, recent trauma, intolerance to end range positions, prior TIA, visual issues, UE neurological compromise )     Prognosis/Rehab Potential:      []Excellent   []Good    [x]Fair   []Poor    Tolerance of evaluation/treatment:    []Excellent   []Good    [x]Fair   []Poor    Physical Therapy Evaluation Complexity Justification  [x] A history of present problem with:  [] no personal factors and/or comorbidities that impact the plan of care;  [x]1-2 personal factors and/or comorbidities that impact the plan of care  []3 personal factors and/or comorbidities that impact the plan of care  [x] An examination of body systems using standardized tests and measures addressing any of the following: body structures and functions (impairments), activity limitations, and/or participation restrictions;:  [] a total of 1-2 or more elements   [] a total of 3 or more elements   [x] a total of 4 or more elements   [x] A clinical presentation with:  [x] stable and/or uncomplicated characteristics   [] evolving clinical presentation with changing characteristics  [] unstable and unpredictable characteristics;   [x] Clinical decision making of [x] low, [] moderate, [] high complexity using standardized patient assessment instrument and/or measurable assessment of functional outcome. [x] EVAL (LOW) 66491 (typically 20 minutes face-to-face)  [] EVAL (MOD) 51390 (typically 30 minutes face-to-face)  [] EVAL (HIGH) 02401 (typically 45 minutes face-to-face)  [] RE-EVAL     PLAN:   Frequency/Duration:  1-2 days per week for up to 12 Weeks:  Interventions:  [x]  Therapeutic exercise including: strength training, ROM, for cervical spine,scapula, core and Upper extremity, including postural re-education. [x]  NMR activation and proprioception for Deep cervical flexors, periscapular and RC muscles and Core, including postural re-education. [x]  Manual therapy as indicated for C/T spine, ribs, Soft tissue to include: Dry Needling/IASTM, STM, PROM, Gr I-IV mobilizations, manipulation. [x] Modalities as needed that may include: thermal agents, E-stim, Biofeedback, US, iontophoresis as indicated  [x] Patient education on joint protection, postural re-education, activity modification, progression of HEP. HEP instruction: (see scanned forms)    GOALS:  Patient stated goal: Get back to walking, caring for children with less pain. [] Progressing: [] Met: [] Not Met: [] Adjusted    Therapist goals for Patient:   Short Term Goals: To be achieved in: 2 weeks  1. Independent in HEP and progression per patient tolerance, in order to prevent re-injury. [] Progressing: [] Met: [] Not Met: [] Adjusted  2. Patient will have a decrease in pain to facilitate improvement in movement, function, and ADLs as indicated by Functional Deficits. [] Progressing: [] Met: [] Not Met: [] Adjusted    Long Term Goals: To be achieved in: 12 weeks  1. Disability index score of 30% or less for the NDI to assist with reaching prior level of function. [] Progressing: [] Met: [] Not Met: [] Adjusted  2.  Patient will demonstrate increased AROM to [facility] of cervical/thoracic spine to allow for proper joint functioning as indicated by patients Functional Deficits. [] Progressing: [] Met: [] Not Met: [] Adjusted  3. Patient will demonstrate an increase in postural awareness and control and activation of  Deep cervical stabilizers to allow for proper functional mobility as indicated by patients Functional Deficits. [] Progressing: [] Met: [] Not Met: [] Adjusted   4. Patient will return to walking 1 mile without increased symptoms or restriction. [] Progressing: [] Met: [] Not Met: [] Adjusted  5. Patient will be able to carry 3year old for 20+ mins at a time without increased symptoms.    [] Progressing: [] Met: [] Not Met: [] Adjusted      Electronically signed by:  Hilton Stewart PT  DPT [id number]

## 2020-08-12 ENCOUNTER — HOSPITAL ENCOUNTER (OUTPATIENT)
Dept: PHYSICAL THERAPY | Age: 35
Setting detail: THERAPIES SERIES
Discharge: HOME | End: 2020-08-12
Payer: MEDICAID

## 2020-08-12 ENCOUNTER — HOSPITAL ENCOUNTER (OUTPATIENT)
Dept: GENERAL RADIOLOGY | Age: 35
Discharge: HOME | End: 2020-08-12
Payer: MEDICAID

## 2020-08-12 ENCOUNTER — OFFICE VISIT (OUTPATIENT)
Dept: RHEUMATOLOGY | Age: 35
End: 2020-08-12
Payer: MEDICAID

## 2020-08-12 VITALS
HEIGHT: 62 IN | DIASTOLIC BLOOD PRESSURE: 80 MMHG | WEIGHT: 190 LBS | HEART RATE: 87 BPM | OXYGEN SATURATION: 98 % | BODY MASS INDEX: 34.96 KG/M2 | TEMPERATURE: 98.3 F | SYSTOLIC BLOOD PRESSURE: 122 MMHG

## 2020-08-12 PROBLEM — Z87.39 HISTORY OF RHEUMATOID ARTHRITIS: Status: ACTIVE | Noted: 2020-08-12

## 2020-08-12 PROCEDURE — 99214 OFFICE O/P EST MOD 30 MIN: CPT | Performed by: INTERNAL MEDICINE

## 2020-08-12 PROCEDURE — G0283 ELEC STIM OTHER THAN WOUND: HCPCS

## 2020-08-12 PROCEDURE — 72202 X-RAY EXAM SI JOINTS 3/> VWS: CPT

## 2020-08-12 PROCEDURE — 97110 THERAPEUTIC EXERCISES: CPT

## 2020-08-12 PROCEDURE — G8417 CALC BMI ABV UP PARAM F/U: HCPCS | Performed by: INTERNAL MEDICINE

## 2020-08-12 PROCEDURE — G8427 DOCREV CUR MEDS BY ELIG CLIN: HCPCS | Performed by: INTERNAL MEDICINE

## 2020-08-12 PROCEDURE — 72100 X-RAY EXAM L-S SPINE 2/3 VWS: CPT

## 2020-08-12 PROCEDURE — 20560 NDL INSJ W/O NJX 1 OR 2 MUSC: CPT

## 2020-08-12 PROCEDURE — 97140 MANUAL THERAPY 1/> REGIONS: CPT

## 2020-08-12 PROCEDURE — 4004F PT TOBACCO SCREEN RCVD TLK: CPT | Performed by: INTERNAL MEDICINE

## 2020-08-12 RX ORDER — DULOXETIN HYDROCHLORIDE 20 MG/1
CAPSULE, DELAYED RELEASE ORAL
Qty: 30 CAPSULE | Refills: 2 | Status: SHIPPED | OUTPATIENT
Start: 2020-08-12 | End: 2020-11-09 | Stop reason: ALTCHOICE

## 2020-08-12 NOTE — FLOWSHEET NOTE
[facility], [address]  Phone: [phone]  Fax [phone]    Physical Therapy Daily Treatment Note  Date:  2020    Patient Name:  Rabia Laguna    :  1985  MRN: [id number]  Restrictions/Precautions:    Physician Information:  Referring Practitioner: Dr. Ryan Galloway  Medical/Treatment Diagnosis Information:  · Diagnosis: S16.1XX (ICD-10-CM) - Strain of neck muscle; M54.2 (ICD-10-CM) - Neck pain  · Treatment Diagnosis: Cervical pain M24.2, Strain of neck tulgdhB34. 1XX                                      [x] Conservative / [] Surgical - DOS:  Therapy Diagnosis/Practice Pattern:  Practice Pattern C: Impaired Muscle Performance  Insurance/Certification information:  PT Insurance Information: 2020 [employer] - $0CP - $0DED - 30PT - 100% - AUTH AFTER 30V  Plan of care signed: [] YES  [] NO  Number of Comorbidities:  []0     []1-2    [x]3+  Date of Patient follow up with Physician:     G-Code (if applicable):      Date G-Code Applied:         Progress Note: [x]  Yes  []  No  Next due by: Visit #10          Is this a Progress Report:     []  Yes  [x]  No        If Yes:  Date Range for reporting period:  Beginning 2020  Ending     Progress report will be due (10 Rx or 30 days whichever is less): 4801       Recertification will be due (POC Duration  / 90 days whichever is less): 10/28/2020     Latex Allergy:  [x]NO      []YES  Preferred Language for Healthcare:   [x]English       []other:    Visit # Insurance Allowable Reporting Period   2 30 Begin Date: 2020               End Date:       SUBJECTIVE:  No issues with HEP.  Still feels tight but muscle relaxer and stretch are helping    OBJECTIVE: See eval   Observation:   Palpation:     Test used Initial score Current Score   Pain Summary VAS     Functional questionnaire NDI     ROM Cervical Flexion      Cervical Ext      Cervical SB R/L      Cervical soreness, etc), outcomes, and recommendations for exercise. **      Therapeutic Exercise and NMR EXR  [x] (11645) Provided verbal/tactile cueing for activities related to strengthening, flexibility, endurance, ROM  for improvements in cervical, postural, scapular, scapulothoracic and UE control with self care, reaching, carrying, lifting, house/yardwork, driving/computer work.    [] (26205) Provided verbal/tactile cueing for activities related to improving balance, coordination, kinesthetic sense, posture, motor skill, proprioception  to assist with cervical, scapular, scapulothoracic and UE control with self care, reaching, carrying, lifting, house/yardwork, driving/computer work. Therapeutic Activities:    [] (11014 or 38708) Provided verbal/tactile cueing for activities related to improving balance, coordination, kinesthetic sense, posture, motor skill, proprioception and motor activation to allow for proper function of cervical, scapular, scapulothoracic and UE control with self care, carrying, lifting, driving/computer work.      Home Exercise Program:    [x] (06357) Reviewed/Progressed HEP activities related to strengthening, flexibility, endurance, ROM of cervical, scapular, scapulothoracic and UE control with self care, reaching, carrying, lifting, house/yardwork, driving/computer work  [] (98840) Reviewed/Progressed HEP activities related to improving balance, coordination, kinesthetic sense, posture, motor skill, proprioception of cervical, scapular, scapulothoracic and UE control with self care, reaching, carrying, lifting, house/yardwork, driving/computer work      Manual Treatments:  PROM / STM / Oscillations-Mobs:  G-I, II, III, IV (PA's, Inf., Post.)  [x] (31637) Provided manual therapy to mobilize soft tissue/joints of cervical/CT, scapular GHJ and UE for the purpose of decreasing headache, modulating pain, promoting relaxation,  increasing ROM, reducing/eliminating soft tissue swelling/inflammation/restriction, improving soft tissue extensibility and allowing for proper ROM for normal function with self care, reaching, carrying, lifting, house/yardwork, driving/computer work    Modalities:     [] GR/ESU    [] GR    [x] ESU     [] CP    [x] MHP    [] declined   PM L UT to neck, R UT to neck    Charges:  Timed Code Treatment Minutes: 30   Total Treatment Minutes: 70     [] EVAL (LOW) 79366 (typically 20 minutes face-to-face)  [] EVAL (MOD) 35633 (typically 30 minutes face-to-face)  [] EVAL (HIGH) 55711 (typically 45 minutes face-to-face)  [] RE-EVAL     [x] VV(95294) x 1    [] IONTO  [] NMR (96822) x     [] VASO  [x] Manual (38779) x 1     [] Other:  [] TA x      [] Mech Traction (39614)  [] ES(attended) (57286)      [x] ES (un) (83167): Trigger point Dry Needling:  fine needle insertion into myofascial trigger points to stimulate a healing response  [x] (66827) Needle insertion without injection: 1 or 2 muscles  [] (77773) Needle insertion without injection: 3 or more muscles      GOALS: Patient stated goal: Get back to walking, caring for children with less pain. [] Progressing: [] Met: [] Not Met: [] Adjusted    Therapist goals for Patient:   Short Term Goals: To be achieved in: 2 weeks  1. Independent in HEP and progression per patient tolerance, in order to prevent re-injury. [] Progressing: [] Met: [] Not Met: [] Adjusted  2. Patient will have a decrease in pain to facilitate improvement in movement, function, and ADLs as indicated by Functional Deficits. [] Progressing: [] Met: [] Not Met: [] Adjusted    Long Term Goals: To be achieved in: 12 weeks  1. Disability index score of 30% or less for the NDI to assist with reaching prior level of function. [] Progressing: [] Met: [] Not Met: [] Adjusted  2. Patient will demonstrate increased AROM to [facility] of cervical/thoracic spine to allow for proper joint functioning as indicated by patients Functional Deficits.    [] Progressing: [] Met: [] Not Met: [] Adjusted  3. Patient will demonstrate an increase in postural awareness and control and activation of  Deep cervical stabilizers to allow for proper functional mobility as indicated by patients Functional Deficits. [] Progressing: [] Met: [] Not Met: [] Adjusted   4. Patient will return to walking 1 mile without increased symptoms or restriction. [] Progressing: [] Met: [] Not Met: [] Adjusted  5. Patient will be able to carry 3year old for 20+ mins at a time without increased symptoms. [] Progressing: [] Met: [] Not Met: [] Adjusted        Overall Progression Towards Functional goals/ Treatment Progress Update:  [] Patient is progressing as expected towards functional goals listed. [] Progression is slowed due to complexities/Impairments listed. [] Progression has been slowed due to co-morbidities. [x] Plan just implemented, too soon to assess goals progression <30days   [] Goals require adjustment due to lack of progress  [] Patient is not progressing as expected and requires additional follow up with physician  [] Other    Prognosis for POC: [x] Good [] Fair  [] Poor      Patient requires continued skilled intervention: [x] Yes  [] No      ASSESSMENT: Tolerated dry needling well, significant LTRs in UT, increased tissue tension throughout neck. Felt E-stim helped reduce soreness at end. Requires cueing to reduce UT compensation. Will benefit from continued skilled PT to reduce tissue tension. Increase cervical mobility, increase cervical/postural strength and awareness to reduce symptoms and risk of re-injury.     Treatment/Activity Tolerance:  [x] Patient tolerated treatment well [] Patient limited by fatique  [] Patient limited by pain  [] Patient limited by other medical complications  [] Other:     Patient Requires Follow-up: [x] Yes  [] No    PLAN: See eval  [] Continue per plan of care [] Alter current plan (see comments above)  [x] Plan of care initiated [] Hold pending MD visit [] Discharge      Electronically signed by:  Beto Ramsay PT , DPT [id number]    Note: If patient does not return for scheduled/ recommended follow up visits, this note will serve as a discharge from care along with most recent update on progress.

## 2020-08-14 ENCOUNTER — APPOINTMENT (OUTPATIENT)
Dept: PHYSICAL THERAPY | Age: 35
End: 2020-08-14
Payer: MEDICAID

## 2020-08-19 ENCOUNTER — HOSPITAL ENCOUNTER (OUTPATIENT)
Dept: PHYSICAL THERAPY | Age: 35
Setting detail: THERAPIES SERIES
Discharge: HOME | End: 2020-08-19
Payer: MEDICAID

## 2020-08-21 ENCOUNTER — HOSPITAL ENCOUNTER (OUTPATIENT)
Dept: PHYSICAL THERAPY | Age: 35
Setting detail: THERAPIES SERIES
Discharge: HOME | End: 2020-08-21
Payer: MEDICAID

## 2020-08-21 NOTE — FLOWSHEET NOTE
[facility], [address]        Physical Therapy  Cancellation/No-show Note  Patient Name:  Adrienne Rowland  :  1985   Date:  2020  Cancelled visits to date: 1  No-shows to date: 1    For today's appointment patient:  ?  Cancelled  ? Rescheduled appointment  ? X  No-show     Reason given by patient:  ?  Patient ill  ? Conflicting appointment  ? No transportation    ? Conflict with work  ? No reason given  ?   Other:     Comments:      Electronically signed by:  Sussy Siddiqui PT

## 2020-08-26 ENCOUNTER — HOSPITAL ENCOUNTER (OUTPATIENT)
Dept: PHYSICAL THERAPY | Age: 35
Setting detail: THERAPIES SERIES
Discharge: HOME | End: 2020-08-26
Payer: MEDICAID

## 2020-08-26 PROCEDURE — 97140 MANUAL THERAPY 1/> REGIONS: CPT

## 2020-08-26 PROCEDURE — 97110 THERAPEUTIC EXERCISES: CPT

## 2020-08-26 PROCEDURE — 20560 NDL INSJ W/O NJX 1 OR 2 MUSC: CPT

## 2020-08-26 PROCEDURE — G0283 ELEC STIM OTHER THAN WOUND: HCPCS

## 2020-08-26 NOTE — FLOWSHEET NOTE
[facility], [address]  Phone: [phone]  Fax [phone]    Physical Therapy Daily Treatment Note  Date:  2020    Patient Name:  Ralph Peña    :  1985  MRN: [id number]  Restrictions/Precautions:    Physician Information:  Referring Practitioner: Dr. Carlos Garduno  Medical/Treatment Diagnosis Information:  · Diagnosis: S16.1XX (ICD-10-CM) - Strain of neck muscle; M54.2 (ICD-10-CM) - Neck pain  · Treatment Diagnosis: Cervical pain M24.2, Strain of neck [id number]. 1XX                                      [x] Conservative / [] Surgical - DOS:  Therapy Diagnosis/Practice Pattern:  Practice Pattern C: Impaired Muscle Performance  Insurance/Certification information:  PT Insurance Information: 2020 [employer]ACE - $0CP - $0DED - 30PT - 100% - AUTH AFTER 30V  Plan of care signed: [] YES  [] NO  Number of Comorbidities:  []0     []1-2    [x]3+  Date of Patient follow up with Physician:     G-Code (if applicable):      Date G-Code Applied:         Progress Note: [x]  Yes  []  No  Next due by: Visit #10          Is this a Progress Report:     []  Yes  [x]  No        If Yes:  Date Range for reporting period:  Beginning 2020  Ending     Progress report will be due (10 Rx or 30 days whichever is less):        Recertification will be due (POC Duration  / 90 days whichever is less): 10/28/2020     Latex Allergy:  [x]NO      []YES  Preferred Language for Healthcare:   [x]English       []other:    Visit # Insurance Allowable Reporting Period   3 30 Begin Date: 2020               End Date:       SUBJECTIVE:  Patient reports she had to miss last week due to having kidney stone, still dealing with it but doing better and felt she could come today. Reports needling made her sore but has noticed less tightness and some improvement in symptoms since then.     OBJECTIVE: See eval   Observation:   Palpation: Test used Initial score Current Score   Pain Summary VAS     Functional questionnaire NDI     ROM Cervical Flexion      Cervical Ext      Cervical SB R/L      Cervical rotation R/L     Strength DNF                    RESTRICTIONS/PRECAUTIONS: RA, potential ankylosing spondylitis     Exercises/Interventions:   Therapeutic Ex Sets Reps Notes HEP   Posture ed       Supine/seated chin tuck  10  X   UT/levator stretch 2 30\"  X   SBS/No $  10      SNAGS  10 x     TB row/ext  10 x RTB X   Open book  Thread the needle 10 x  10 x   X  X                        Manual Intervention       Cerv mobs/manip 10 min      Thoracic mobs/manip       CT manip       Rib mobilizations       STM              Needling 5 min      NMR re-education       T-spine Ext- foam roll       Chin tucks                             Traction                       Consent signed 8/12/2020 and precautions addressed. See media tab. Muscle  Needle Size Technique Notes IES   Site 1 Cervical multifidi 2x R 2x L .3x50mm [] Pistoning / []  Threading  []  Winding/Coning Significant tissue resistance []    Site 2 L UT 3 x .3x50mm [] Pistoning / []  Threading  []  Winding/Coning Significant LTRs mid belly  Significant tissue resistance []    Site 3   [] Pistoning / []  Threading  []  Winding/Coning  []    Site 4   [] Pistoning / []  Threading  []  Winding/Coning  []    Site 5   [] Pistoning / []  Threading  []  Winding/Coning  []    Site 6   [] Pistoning / []  Threading  []  Winding/Coning  []      **The above techniques were used to restore functional range of motion, reduce muscle spasm, and induce healing in the corresponding musculature by means of intramuscular mobilization. Clean Technique was utilized today while applying the Dry needling treatment. The treatment sites where cleaned with 70% solution of isopropyl alcohol. The PT washed their hands and utilized treatment gloves along with hand  prior to inserting the needles.   All needles where removed and discarded in the appropriate sharps container. MD has given verbal and/or written approval for this treatment. **        ** Educated patient on anatomy, trigger point etiology, expectations for TDN (bruising, soreness, etc), outcomes, and recommendations for exercise. **      Therapeutic Exercise and NMR EXR  [x] (38318) Provided verbal/tactile cueing for activities related to strengthening, flexibility, endurance, ROM  for improvements in cervical, postural, scapular, scapulothoracic and UE control with self care, reaching, carrying, lifting, house/yardwork, driving/computer work.    [] (91783) Provided verbal/tactile cueing for activities related to improving balance, coordination, kinesthetic sense, posture, motor skill, proprioception  to assist with cervical, scapular, scapulothoracic and UE control with self care, reaching, carrying, lifting, house/yardwork, driving/computer work. Therapeutic Activities:    [] (56460 or 96163) Provided verbal/tactile cueing for activities related to improving balance, coordination, kinesthetic sense, posture, motor skill, proprioception and motor activation to allow for proper function of cervical, scapular, scapulothoracic and UE control with self care, carrying, lifting, driving/computer work.      Home Exercise Program:    [x] (35560) Reviewed/Progressed HEP activities related to strengthening, flexibility, endurance, ROM of cervical, scapular, scapulothoracic and UE control with self care, reaching, carrying, lifting, house/yardwork, driving/computer work  [] (43286) Reviewed/Progressed HEP activities related to improving balance, coordination, kinesthetic sense, posture, motor skill, proprioception of cervical, scapular, scapulothoracic and UE control with self care, reaching, carrying, lifting, house/yardwork, driving/computer work      Manual Treatments:  PROM / STM / Oscillations-Mobs:  G-I, II, III, IV (PA's, Inf., Post.)  [x] (95144) Provided manual therapy to mobilize soft tissue/joints of cervical/CT, scapular GHJ and UE for the purpose of decreasing headache, modulating pain, promoting relaxation,  increasing ROM, reducing/eliminating soft tissue swelling/inflammation/restriction, improving soft tissue extensibility and allowing for proper ROM for normal function with self care, reaching, carrying, lifting, house/yardwork, driving/computer work    Modalities:     [] GR/ESU    [] GR    [x] ESU     [] CP    [x] MHP    [] declined   PM L UT to neck, R UT to neck    Charges:  Timed Code Treatment Minutes: 40   Total Treatment Minutes: 65     [] EVAL (LOW) 04156 (typically 20 minutes face-to-face)  [] EVAL (MOD) 21195 (typically 30 minutes face-to-face)  [] EVAL (HIGH) 56103 (typically 45 minutes face-to-face)  [] RE-EVAL     [x] TA(68886) x 2    [] IONTO  [] NMR (53071) x     [] VASO  [x] Manual (70147) x 1     [] Other:  [] TA x      [] Mech Traction (75590)  [] ES(attended) (60869)      [x] ES (un) (97500): Trigger point Dry Needling:  fine needle insertion into myofascial trigger points to stimulate a healing response  [x] (38887) Needle insertion without injection: 1 or 2 muscles  [] (55024) Needle insertion without injection: 3 or more muscles    GOALS: Patient stated goal: Get back to walking, caring for children with less pain. [x] Progressing: [] Met: [] Not Met: [] Adjusted    Therapist goals for Patient:   Short Term Goals: To be achieved in: 2 weeks  1. Independent in HEP and progression per patient tolerance, in order to prevent re-injury. [x] Progressing: [] Met: [] Not Met: [] Adjusted  2. Patient will have a decrease in pain to facilitate improvement in movement, function, and ADLs as indicated by Functional Deficits. [x] Progressing: [] Met: [] Not Met: [] Adjusted    Long Term Goals: To be achieved in: 12 weeks  1. Disability index score of 30% or less for the NDI to assist with reaching prior level of function.    [x] Progressing: [] Met: [] Not Met: [] Adjusted  2. Patient will demonstrate increased AROM to [facility] of cervical/thoracic spine to allow for proper joint functioning as indicated by patients Functional Deficits. [x] Progressing: [] Met: [] Not Met: [] Adjusted  3. Patient will demonstrate an increase in postural awareness and control and activation of  Deep cervical stabilizers to allow for proper functional mobility as indicated by patients Functional Deficits. [x] Progressing: [] Met: [] Not Met: [] Adjusted   4. Patient will return to walking 1 mile without increased symptoms or restriction. [x] Progressing: [] Met: [] Not Met: [] Adjusted  5. Patient will be able to carry 3year old for 20+ mins at a time without increased symptoms. [x] Progressing: [] Met: [] Not Met: [] Adjusted        Overall Progression Towards Functional goals/ Treatment Progress Update:  [] Patient is progressing as expected towards functional goals listed. [] Progression is slowed due to complexities/Impairments listed. [] Progression has been slowed due to co-morbidities. [x] Plan just implemented, too soon to assess goals progression <30days   [] Goals require adjustment due to lack of progress  [] Patient is not progressing as expected and requires additional follow up with physician  [] Other    Prognosis for POC: [x] Good [] Fair  [] Poor      Patient requires continued skilled intervention: [x] Yes  [] No      ASSESSMENT: Tolerated dry needling well, significant LTRs in UT, increased tissue tension throughout neck. Felt E-stim helped reduce soreness at end. Requires cueing to reduce UT compensation. Will benefit from continued skilled PT to reduce tissue tension. Increase cervical mobility, increase cervical/postural strength and awareness to reduce symptoms and risk of re-injury.     Treatment/Activity Tolerance:  [x] Patient tolerated treatment well [] Patient limited by fatique  [] Patient limited by pain  [] Patient limited by other medical complications  [] Other:     Patient Requires Follow-up: [x] Yes  [] No    PLAN: See eval  [] Continue per plan of care [] Alter current plan (see comments above)  [x] Plan of care initiated [] Hold pending MD visit [] Discharge      Electronically signed by:  Prescilla Lombard, PT , DPT [id number]    Note: If patient does not return for scheduled/ recommended follow up visits, this note will serve as a discharge from care along with most recent update on progress.

## 2020-08-28 ENCOUNTER — HOSPITAL ENCOUNTER (OUTPATIENT)
Dept: PHYSICAL THERAPY | Age: 35
Setting detail: THERAPIES SERIES
Discharge: HOME | End: 2020-08-28
Payer: MEDICAID

## 2020-08-28 PROCEDURE — G0283 ELEC STIM OTHER THAN WOUND: HCPCS

## 2020-08-28 PROCEDURE — 97140 MANUAL THERAPY 1/> REGIONS: CPT

## 2020-08-28 PROCEDURE — 97110 THERAPEUTIC EXERCISES: CPT

## 2020-08-28 NOTE — FLOWSHEET NOTE
[facility],  [address]  Phone: [phone]  Fax [phone]    Physical Therapy Daily Treatment Note  Date:  2020    Patient Name:  Sloane Curiel    :  1985  MRN: [id number]  Restrictions/Precautions:    Physician Information:  Referring Practitioner: Dr. Zan Huitron  Medical/Treatment Diagnosis Information:  · Diagnosis: S16.1XX (ICD-10-CM) - Strain of neck muscle; M54.2 (ICD-10-CM) - Neck pain  · Treatment Diagnosis: Cervical pain M24.2, Strain of neck pmrfqrH59. 1XX                                      [x] Conservative / [] Surgical - DOS:  Therapy Diagnosis/Practice Pattern:  Practice Pattern C: Impaired Muscle Performance  Insurance/Certification information:  PT Insurance Information: 2020 [employer] - $0CP - $0DED - 30PT - 100% - AUTH AFTER 30V  Plan of care signed: [] YES  [] NO  Number of Comorbidities:  []0     []1-2    [x]3+  Date of Patient follow up with Physician:     G-Code (if applicable):      Date G-Code Applied:         Progress Note: [x]  Yes  []  No  Next due by: Visit #10          Is this a Progress Report:     []  Yes  [x]  No        If Yes:  Date Range for reporting period:  Beginning 2020  Ending     Progress report will be due (10 Rx or 30 days whichever is less):        Recertification will be due (POC Duration  / 90 days whichever is less): 10/28/2020     Latex Allergy:  [x]NO      []YES  Preferred Language for Healthcare:   [x]English       []other:    Visit # Insurance Allowable Reporting Period    Begin Date: 2020               End Date:       SUBJECTIVE:  Patient reports needling helped relieve some tension in neck and upper trap.  Reports she does have a really tender spot near base of skull left side of neck    OBJECTIVE: See eval   Observation:   Palpation:     Test used Initial score Current Score   Pain Summary VAS     Functional questionnaire NDI discarded in the appropriate sharps container. MD has given verbal and/or written approval for this treatment. **        ** Educated patient on anatomy, trigger point etiology, expectations for TDN (bruising, soreness, etc), outcomes, and recommendations for exercise. **      Therapeutic Exercise and NMR EXR  [x] (56354) Provided verbal/tactile cueing for activities related to strengthening, flexibility, endurance, ROM  for improvements in cervical, postural, scapular, scapulothoracic and UE control with self care, reaching, carrying, lifting, house/yardwork, driving/computer work.    [] (62689) Provided verbal/tactile cueing for activities related to improving balance, coordination, kinesthetic sense, posture, motor skill, proprioception  to assist with cervical, scapular, scapulothoracic and UE control with self care, reaching, carrying, lifting, house/yardwork, driving/computer work. Therapeutic Activities:    [] (55970 or 63521) Provided verbal/tactile cueing for activities related to improving balance, coordination, kinesthetic sense, posture, motor skill, proprioception and motor activation to allow for proper function of cervical, scapular, scapulothoracic and UE control with self care, carrying, lifting, driving/computer work.      Home Exercise Program:    [x] (73999) Reviewed/Progressed HEP activities related to strengthening, flexibility, endurance, ROM of cervical, scapular, scapulothoracic and UE control with self care, reaching, carrying, lifting, house/yardwork, driving/computer work  [] (64970) Reviewed/Progressed HEP activities related to improving balance, coordination, kinesthetic sense, posture, motor skill, proprioception of cervical, scapular, scapulothoracic and UE control with self care, reaching, carrying, lifting, house/yardwork, driving/computer work      Manual Treatments:  PROM / STM / Oscillations-Mobs:  G-I, II, III, IV (PA's, Inf., Post.)  [x] (70204) Provided manual therapy to mobilize soft tissue/joints of cervical/CT, scapular GHJ and UE for the purpose of decreasing headache, modulating pain, promoting relaxation,  increasing ROM, reducing/eliminating soft tissue swelling/inflammation/restriction, improving soft tissue extensibility and allowing for proper ROM for normal function with self care, reaching, carrying, lifting, house/yardwork, driving/computer work    Modalities:     [] GR/ESU    [] GR    [x] ESU     [] CP    [x] MHP    [] declined   PM L UT to neck, R UT to neck    Charges:  Timed Code Treatment Minutes: 40   Total Treatment Minutes: 55     [] EVAL (LOW) 91231 (typically 20 minutes face-to-face)  [] EVAL (MOD) 26660 (typically 30 minutes face-to-face)  [] EVAL (HIGH) 67694 (typically 45 minutes face-to-face)  [] RE-EVAL     [x] AI(28442) x 1    [] IONTO  [] NMR (95348) x     [] VASO  [x] Manual (00525) x 2     [] Other:  [] TA x      [] Mech Traction (30203)  [] ES(attended) (69554)      [x] ES (un) (70908): Trigger point Dry Needling:  fine needle insertion into myofascial trigger points to stimulate a healing response  [] (64225) Needle insertion without injection: 1 or 2 muscles  [] (50556) Needle insertion without injection: 3 or more muscles    GOALS: Patient stated goal: Get back to walking, caring for children with less pain. [x] Progressing: [] Met: [] Not Met: [] Adjusted    Therapist goals for Patient:   Short Term Goals: To be achieved in: 2 weeks  1. Independent in HEP and progression per patient tolerance, in order to prevent re-injury. [x] Progressing: [] Met: [] Not Met: [] Adjusted  2. Patient will have a decrease in pain to facilitate improvement in movement, function, and ADLs as indicated by Functional Deficits. [x] Progressing: [] Met: [] Not Met: [] Adjusted    Long Term Goals: To be achieved in: 12 weeks  1. Disability index score of 30% or less for the NDI to assist with reaching prior level of function.    [x] Progressing: [] Met: [] Not Met: [] Adjusted  2. Patient will demonstrate increased AROM to [facility] of cervical/thoracic spine to allow for proper joint functioning as indicated by patients Functional Deficits. [x] Progressing: [] Met: [] Not Met: [] Adjusted  3. Patient will demonstrate an increase in postural awareness and control and activation of  Deep cervical stabilizers to allow for proper functional mobility as indicated by patients Functional Deficits. [x] Progressing: [] Met: [] Not Met: [] Adjusted   4. Patient will return to walking 1 mile without increased symptoms or restriction. [x] Progressing: [] Met: [] Not Met: [] Adjusted  5. Patient will be able to carry 3year old for 20+ mins at a time without increased symptoms. [x] Progressing: [] Met: [] Not Met: [] Adjusted        Overall Progression Towards Functional goals/ Treatment Progress Update:  [] Patient is progressing as expected towards functional goals listed. [] Progression is slowed due to complexities/Impairments listed. [] Progression has been slowed due to co-morbidities. [x] Plan just implemented, too soon to assess goals progression <30days   [] Goals require adjustment due to lack of progress  [] Patient is not progressing as expected and requires additional follow up with physician  [] Other    Prognosis for POC: [x] Good [] Fair  [] Poor      Patient requires continued skilled intervention: [x] Yes  [] No      ASSESSMENT: Tolerated dry needling well, significant LTRs in UT, increased tissue tension throughout neck. Felt E-stim helped reduce soreness at end. Requires cueing to reduce UT compensation. Will benefit from continued skilled PT to reduce tissue tension. Increase cervical mobility, increase cervical/postural strength and awareness to reduce symptoms and risk of re-injury.     Treatment/Activity Tolerance:  [x] Patient tolerated treatment well [] Patient limited by fatique  [] Patient limited by pain  [] Patient limited by other medical complications  [] Other:     Patient Requires Follow-up: [x] Yes  [] No    PLAN: See eval  [] Continue per plan of care [] Alter current plan (see comments above)  [x] Plan of care initiated [] Hold pending MD visit [] Discharge      Electronically signed by:  Annamaria Rivers PT , DPT [id number]    Note: If patient does not return for scheduled/ recommended follow up visits, this note will serve as a discharge from care along with most recent update on progress.

## 2020-09-02 ENCOUNTER — HOSPITAL ENCOUNTER (OUTPATIENT)
Dept: PHYSICAL THERAPY | Age: 35
Setting detail: THERAPIES SERIES
Discharge: HOME | End: 2020-09-02
Payer: MEDICAID

## 2020-09-02 ENCOUNTER — OFFICE VISIT (OUTPATIENT)
Dept: ORTHOPEDIC SURGERY | Age: 35
End: 2020-09-02
Payer: MEDICAID

## 2020-09-02 VITALS — WEIGHT: 190 LBS | BODY MASS INDEX: 34.96 KG/M2 | HEIGHT: 62 IN

## 2020-09-02 PROCEDURE — 97110 THERAPEUTIC EXERCISES: CPT

## 2020-09-02 PROCEDURE — G8427 DOCREV CUR MEDS BY ELIG CLIN: HCPCS | Performed by: FAMILY MEDICINE

## 2020-09-02 PROCEDURE — 4004F PT TOBACCO SCREEN RCVD TLK: CPT | Performed by: FAMILY MEDICINE

## 2020-09-02 PROCEDURE — 99213 OFFICE O/P EST LOW 20 MIN: CPT | Performed by: FAMILY MEDICINE

## 2020-09-02 PROCEDURE — 97140 MANUAL THERAPY 1/> REGIONS: CPT

## 2020-09-02 PROCEDURE — G8417 CALC BMI ABV UP PARAM F/U: HCPCS | Performed by: FAMILY MEDICINE

## 2020-09-02 RX ORDER — ETODOLAC 400 MG/1
400 TABLET, FILM COATED ORAL 2 TIMES DAILY
Qty: 60 TABLET | Refills: 3 | Status: SHIPPED | OUTPATIENT
Start: 2020-09-02 | End: 2020-11-09 | Stop reason: ALTCHOICE

## 2020-09-02 NOTE — PROGRESS NOTES
is helped loosen up her trapezius and she does have some extension to the periscapular region. Cervical rotation and extension does aggravate her symptoms as does housework and yard work. She has tried alternating Motrin and Tylenol with limited success. She has noticed possibly some weakness into her hand as well as numbness and tingling. She has noted that she will have to shake her hands awake in the morning and with driving. She is being seen today for orthopedic and sports consultation with imaging. She was initially evaluated in office on 7/20/2020 and given her exam findings and chronicity of her symptoms, we did send her for cervical MRI which was [facility] on 7/29/2020 and did show evidence of straightening about the cervical spine consistent with muscle spasm and some low-grade spondylolysis and noncompressive disc protrusions at C5-6 and C6-7 with spondylitic change. There is not appear to be high-grade foraminal narrowing or high-grade disc herniation. We had held off formal therapy pending the results of her MRI and she is actually set up for her EMGs with Dr. Evelyn Park of her upper extremities tomorrow on 8/4/2020. She did not get much in the way of benefit from her Medrol pack but has tolerated her diclofenac. She is feeling about the same as her initial evaluation a couple weeks ago. Denies focal weakness. She has most of her discomfort at night. She actually has seen Dr. Deanna Ascencio and is in the process of her rheumatologic work-up once again. She was last seen in the office on 8/3/2020 reviewed her cervical MRI. She was continued on conservative treatment and did have her follow-up EMG of the left upper extremity on 8/4/2020. This did not show any evidence of carpal tunnel or obvious signs of plexopathy or radiculopathy. She once again is under the care of Dr. Indio Ibrahim and is being worked up for her rheumatologic conditions as well.   She is continue with her pain with axial load testing. Negative Spurling's on the left and negative on the right. Once again no focal upper extremity motor deficits noted. She does appear to have a positive Tinel's at the carpal tunnel left greater than right with negative cubital tunnel Tinel's and no evidence of thenar atrophy. Skin: There are no rashes, ulcerations or lesions. Distal motor sensory and vascular exams intact. Gait: Reasonably fluid gait. Additional Comments:            Additional Examinations:      Right Upper Extremity:  Examination of the right upper extremity does not show any tenderness, deformity or injury. Range of motion is unremarkable. There is no gross instability. There are no rashes, ulcerations or lesions. Strength and tone are normal.  Left Upper Extremity: Examination of the left upper extremity does not show any tenderness, deformity or injury. Range of motion is unremarkable. There is no gross instability. There are no rashes, ulcerations or lesions. Strength and tone are normal.              Diagnostic Test Findings:    Cervical MRI performed at [facility] on 2020 as listed above     Narrative    Site: [facility] #: [id number] #: [id number] Procedure: MR Cervical Spine w/o Contrast ; Reason for Exam: Neck pain. Strain of neck muscle, initial encounter. Cervical myofascial pain syndrome. Left arm numbness    This document is confidential medical information.  Unauthorized disclosure or use of this information is prohibited by law. If you are not the intended recipient of this document, please advise us by calling immediately [phone].         [place] [address]              Patient Name: Yohan Mcintosh    Case ID: [id number]    Patient : 1985    Referring Physician: Neisha Ho MD    Exam Date: 2020    Exam Description: MR Cervical Spine w/o Contrast              HISTORY:   Neck pain.  Pain medial right clavicle.         TECHNICAL FACTORS:  Long- and short-axis fat- and water-weighted images were performed.         COMPARISON:  None.         FINDINGS:   No prevertebral soft tissue swelling or fracture.  Straightening of spine    presumably represents muscle spasm.         The craniocervical junction and cervical cord normal.         Minor spondylosis without substantial disc space narrowing.         Findings at individual levels demonstrate:         C2-3, C3-4, C4-5 unremarkable.         C5-6 shallow disc osteophyte complex with no neural compression.         C6-7 shallow central disc osteophyte complex with no neural compression.         C7-T1 unremarkable.         Visualized soft tissues of the neck normal.         CONCLUSION:    Muscle spasm and minor spondylosis.  No compressive abnormality in the cervical spine.         Thank you for the opportunity to provide your interpretation.                   Bruce Sinha MD         A: OSIEL 07/29/2020 4:42 PM           Negative left upper extremity EMG per Dr. Tea Sims 8/4/2020    Assessment: #1. Symptomatically improving cervical pain with cervical myofascial pain with low-grade cervical spondylolysis and no evidence of compressive discopathy. #2.  Markedly improved left upper extremity numbness and tingling with negative left upper extremity EMG 8/4/2020  #.3. Chronic effectively untreated rheumatoid arthritis currently seeing Dr. Luis Eduardo Bautista    #4. Residual episodic right upper extremity numbness and tingling suggestive of possible low-grade ulnar neuritis    Impression:       Encounter Diagnoses   Name Primary?     Cervical myofascial pain syndrome Yes    Strain of neck muscle, initial encounter     Neck pain     Left arm numbness     Neuritis of right ulnar nerve        Office Procedures:       Orders Placed This Encounter   Procedures    Ambulatory referral to Physical Therapy     Referral Priority:   Routine     Referral Type:   Eval and possible that there are still dictated errors within this office note. If so, please bring any errors to my attention for an addendum. All efforts were made to ensure that this office note is accurate.

## 2020-09-02 NOTE — FLOWSHEET NOTE
[place] [address] [facility], [address]  Phone: [phone]  Fax [phone]    Physical Therapy Daily Treatment Note  Date:  2020    Patient Name:  Sloane Curiel    :  1985  MRN: [id number]  Restrictions/Precautions:    Physician Information:  Referring Practitioner: Dr. Zan Huitron  Medical/Treatment Diagnosis Information:  · Diagnosis: S16.1XX (ICD-10-CM) - Strain of neck muscle; M54.2 (ICD-10-CM) - Neck pain  · Treatment Diagnosis: Cervical pain M24.2, Strain of neck [id number]. 1XX                                      [x] Conservative / [] Surgical - DOS:  Therapy Diagnosis/Practice Pattern:  Practice Pattern C: Impaired Muscle Performance  Insurance/Certification information:  PT Insurance Information: 2020 GRACE - $0CP - $0DED - 30PT - 100% - AUTH AFTER 30V  Plan of care signed: [] YES  [] NO  Number of Comorbidities:  []0     []1-2    [x]3+  Date of Patient follow up with Physician:     G-Code (if applicable):      Date G-Code Applied:         Progress Note: [x]  Yes  []  No  Next due by: Visit #10          Is this a Progress Report:     []  Yes  [x]  No        If Yes:  Date Range for reporting period:  Beginning 2020  Ending     Progress report will be due (10 Rx or 30 days whichever is less): 7882       Recertification will be due (POC Duration  / 90 days whichever is less): 10/28/2020     Latex Allergy:  [x]NO      []YES  Preferred Language for Healthcare:   [x]English       []other:    Visit # Insurance Allowable Reporting Period    Begin Date: 2020               End Date:       SUBJECTIVE:  Patient reports no new issues.  Frustrated with doctor she saw for her RA, does not feel he took her seriously    OBJECTIVE: See eval   Observation:   Palpation:     Test used Initial score Current Score   Pain Summary VAS     Functional questionnaire NDI     ROM Cervical Flexion      Cervical Ext Cervical SB R/L      Cervical rotation R/L     Strength DNF                    RESTRICTIONS/PRECAUTIONS: RA, potential ankylosing spondylitis     Exercises/Interventions:   Therapeutic Ex Sets Reps Notes HEP   Supine snow isadora  Supine alt UE swim 2  2 10 x  10 x     Supine/seated chin tuck  10  X   UT/levator stretch  X   SBS/No $  20 YTB     SNAGS       TB row/ext  10 x RTB X   Open book  Thread the needle 10 x  10 x   X  X    punch  3 way on wall 2 x 10  2 x 10  YTB  YTB    Posture push snow isadora  TB slide 2 x 10  2 x 10             Manual Intervention       Cerv mobs/manip 10 min      Thoracic mobs/manip       CT manip       Rib mobilizations 3 min      STM 10 min             Needling       NMR re-education       T-spine Ext- foam roll       Chin tucks                             Traction                       Consent signed 8/12/2020 and precautions addressed. See media tab. Muscle  Needle Size Technique Notes IES   Site 1 Cervical multifidi 2x R 2x L .3x50mm [] Pistoning / []  Threading  []  Winding/Coning Significant tissue resistance []    Site 2 L UT 3 x .3x50mm [] Pistoning / []  Threading  []  Winding/Coning Significant LTRs mid belly  Significant tissue resistance []    Site 3   [] Pistoning / []  Threading  []  Winding/Coning  []    Site 4   [] Pistoning / []  Threading  []  Winding/Coning  []    Site 5   [] Pistoning / []  Threading  []  Winding/Coning  []    Site 6   [] Pistoning / []  Threading  []  Winding/Coning  []      **The above techniques were used to restore functional range of motion, reduce muscle spasm, and induce healing in the corresponding musculature by means of intramuscular mobilization. Clean Technique was utilized today while applying the Dry needling treatment. The treatment sites where cleaned with 70% solution of isopropyl alcohol. The PT washed their hands and utilized treatment gloves along with hand  prior to inserting the needles.   All needles where therapy to mobilize soft tissue/joints of cervical/CT, scapular GHJ and UE for the purpose of decreasing headache, modulating pain, promoting relaxation,  increasing ROM, reducing/eliminating soft tissue swelling/inflammation/restriction, improving soft tissue extensibility and allowing for proper ROM for normal function with self care, reaching, carrying, lifting, house/yardwork, driving/computer work    Modalities:     [] GR/ESU    [] GR    [] ESU     [] CP    [] MHP    [x] declined   PM L UT to neck, R UT to neck    Charges:  Timed Code Treatment Minutes: 40   Total Treatment Minutes: 40     [] EVAL (LOW) 05594 (typically 20 minutes face-to-face)  [] EVAL (MOD) 07833 (typically 30 minutes face-to-face)  [] EVAL (HIGH) 51921 (typically 45 minutes face-to-face)  [] RE-EVAL     [x] NG(24493) x 1    [] IONTO  [] NMR (61799) x     [] VASO  [x] Manual (57817) x 2     [] Other:  [] TA x      [] Mech Traction (40012)  [] ES(attended) (32917)      [] ES (un) (44923): Trigger point Dry Needling:  fine needle insertion into myofascial trigger points to stimulate a healing response  [] (64536) Needle insertion without injection: 1 or 2 muscles  [] (07450) Needle insertion without injection: 3 or more muscles    GOALS: Patient stated goal: Get back to walking, caring for children with less pain. [x] Progressing: [] Met: [] Not Met: [] Adjusted    Therapist goals for Patient:   Short Term Goals: To be achieved in: 2 weeks  1. Independent in HEP and progression per patient tolerance, in order to prevent re-injury. [x] Progressing: [] Met: [] Not Met: [] Adjusted  2. Patient will have a decrease in pain to facilitate improvement in movement, function, and ADLs as indicated by Functional Deficits. [x] Progressing: [] Met: [] Not Met: [] Adjusted    Long Term Goals: To be achieved in: 12 weeks  1. Disability index score of 30% or less for the NDI to assist with reaching prior level of function.    [x] Progressing: [] Met: [] Not Met: [] Adjusted  2. Patient will demonstrate increased AROM to [facility] of cervical/thoracic spine to allow for proper joint functioning as indicated by patients Functional Deficits. [x] Progressing: [] Met: [] Not Met: [] Adjusted  3. Patient will demonstrate an increase in postural awareness and control and activation of  Deep cervical stabilizers to allow for proper functional mobility as indicated by patients Functional Deficits. [x] Progressing: [] Met: [] Not Met: [] Adjusted   4. Patient will return to walking 1 mile without increased symptoms or restriction. [x] Progressing: [] Met: [] Not Met: [] Adjusted  5. Patient will be able to carry 3year old for 20+ mins at a time without increased symptoms. [x] Progressing: [] Met: [] Not Met: [] Adjusted        Overall Progression Towards Functional goals/ Treatment Progress Update:  [] Patient is progressing as expected towards functional goals listed. [] Progression is slowed due to complexities/Impairments listed. [] Progression has been slowed due to co-morbidities. [x] Plan just implemented, too soon to assess goals progression <30days   [] Goals require adjustment due to lack of progress  [] Patient is not progressing as expected and requires additional follow up with physician  [] Other    Prognosis for POC: [x] Good [] Fair  [] Poor      Patient requires continued skilled intervention: [x] Yes  [] No      ASSESSMENT:  Requires cueing to reduce UT compensation. Fatigued at end of session. Will benefit from continued skilled PT to reduce tissue tension. Increase cervical mobility, increase cervical/postural strength and awareness to reduce symptoms and risk of re-injury.     Treatment/Activity Tolerance:  [x] Patient tolerated treatment well [] Patient limited by fatique  [] Patient limited by pain  [] Patient limited by other medical complications  [] Other:     Patient Requires Follow-up: [x] Yes  [] No    PLAN: See eval  [] Continue per plan of care [] Alter current plan (see comments above)  [x] Plan of care initiated [] Hold pending MD visit [] Discharge      Electronically signed by:  Harrison Davidson PT , DPT [id number]    Note: If patient does not return for scheduled/ recommended follow up visits, this note will serve as a discharge from care along with most recent update on progress.

## 2020-09-10 ENCOUNTER — HOSPITAL ENCOUNTER (OUTPATIENT)
Dept: PHYSICAL THERAPY | Age: 35
Setting detail: THERAPIES SERIES
Discharge: HOME | End: 2020-09-10
Payer: MEDICAID

## 2020-09-10 ENCOUNTER — HOSPITAL ENCOUNTER (OUTPATIENT)
Dept: CT IMAGING | Age: 35
Discharge: HOME | End: 2020-09-10
Payer: MEDICAID

## 2020-09-10 PROCEDURE — 74176 CT ABD & PELVIS W/O CONTRAST: CPT

## 2020-09-10 NOTE — FLOWSHEET NOTE
[place] [facility] [facility], [address], [address] [place] [address]        Physical Therapy  Cancellation/No-show Note  Patient Name:  Deon Graham  :  1985   Date:  9/10/2020  Cancelled visits to date: 1  No-shows to date: 2    For today's appointment patient:  ?  Cancelled  ? Rescheduled appointment  ? X  No-show     Reason given by patient:  ?  Patient ill  ? Conflicting appointment  ? No transportation    ? Conflict with work  ? No reason given  ?   Other:     Comments:  No answer, voicemail left offering later appt    Electronically signed by:  Annamaria Rivers, PT

## 2020-09-17 ENCOUNTER — HOSPITAL ENCOUNTER (OUTPATIENT)
Dept: PHYSICAL THERAPY | Age: 35
Setting detail: THERAPIES SERIES
Discharge: HOME | End: 2020-09-17
Payer: MEDICAID

## 2020-09-17 PROCEDURE — 97110 THERAPEUTIC EXERCISES: CPT

## 2020-09-17 PROCEDURE — 20560 NDL INSJ W/O NJX 1 OR 2 MUSC: CPT

## 2020-09-17 PROCEDURE — 97140 MANUAL THERAPY 1/> REGIONS: CPT

## 2020-09-17 NOTE — FLOWSHEET NOTE
[facility], [address] [place] [address]  Phone: [phone]  Fax [phone]    Physical Therapy Daily Treatment Note  Date:  2020    Patient Name:  Unruly Ruano    :  1985  MRN: [id number]  Restrictions/Precautions:    Physician Information:  Referring Practitioner: Dr. Yesi White  Medical/Treatment Diagnosis Information:  · Diagnosis: S16.1XX (ICD-10-CM) - Strain of neck muscle; M54.2 (ICD-10-CM) - Neck pain  · Treatment Diagnosis: Cervical pain M24.2, Strain of neck [id number]. 1XX                                      [x] Conservative / [] Surgical - DOS:  Therapy Diagnosis/Practice Pattern:  Practice Pattern C: Impaired Muscle Performance  Insurance/Certification information:  PT Insurance Information: 2020 GRACE - $0CP - $0DED - 30PT - 100% - AUTH AFTER 30V  Plan of care signed: [] YES  [] NO  Number of Comorbidities:  []0     []1-2    [x]3+  Date of Patient follow up with Physician:     G-Code (if applicable):      Date G-Code Applied:         Progress Note: [x]  Yes  []  No  Next due by: Visit #10          Is this a Progress Report:     []  Yes  [x]  No        If Yes:  Date Range for reporting period:  Beginning 2020  Ending     Progress report will be due (10 Rx or 30 days whichever is less):        Recertification will be due (POC Duration  / 90 days whichever is less): 10/28/2020     Latex Allergy:  [x]NO      []YES  Preferred Language for Healthcare:   [x]English       []other:    Visit # Insurance Allowable Reporting Period    Begin Date: 2020               End Date:       SUBJECTIVE:  Patient reports neck is feeling a lot better. Biggest complaint now is chest feels really tight and has spasms, worse at night.     OBJECTIVE: See eval   Observation:   Palpation:     Test used Initial score Current Score   Pain Summary VAS     Functional questionnaire NDI     ROM Cervical Flexion Cervical Ext      Cervical SB R/L      Cervical rotation R/L     Strength DNF                    RESTRICTIONS/PRECAUTIONS: RA, potential ankylosing spondylitis     Exercises/Interventions:   Therapeutic Ex Sets Reps Notes HEP   Supine snow isadora  Supine alt UE swim 2  2 10 x  10 x     Supine/seated chin tuck  10  X   UT/levator stretch  X   SBS/No $  20 YTB     SNAGS       TB row/ext  10 x RTB X   Open book   Thread the needle modified with open at top 10 x  10 x   X  X    punch  3 way on wall 2 x 10  2 x 10  YTB  YTB    Posture push snow isadora  TB slide 2 x 10  2 x 10    YTB    Supine and door pec stretch 3 x 30\"      Manual Intervention       Cerv mobs/manip 8 min      Thoracic mobs/manip       CT manip       Rib mobilizations 3 min      STM 6 min             Needling 5 min      NMR re-education       T-spine Ext- foam roll       Chin tucks                             Traction                       Consent signed 8/12/2020 and precautions addressed. See media tab. Muscle  Needle Size Technique Notes IES   Site 1 Cervical multifidi 2x R 2x L .3x50mm [] Pistoning / []  Threading  []  Winding/Coning Significant tissue resistance []    Site 2 L UT 2 x .3x50mm [] Pistoning / []  Threading  []  Winding/Coning Significant LTRs mid belly  Significant tissue resistance []    Site 3   [] Pistoning / []  Threading  []  Winding/Coning  []    Site 4   [] Pistoning / []  Threading  []  Winding/Coning  []    Site 5   [] Pistoning / []  Threading  []  Winding/Coning  []    Site 6   [] Pistoning / []  Threading  []  Winding/Coning  []      **The above techniques were used to restore functional range of motion, reduce muscle spasm, and induce healing in the corresponding musculature by means of intramuscular mobilization. Clean Technique was utilized today while applying the Dry needling treatment. The treatment sites where cleaned with 70% solution of isopropyl alcohol.  The PT washed their hands and utilized treatment gloves along with hand  prior to inserting the needles. All needles where removed and discarded in the appropriate sharps container. MD has given verbal and/or written approval for this treatment. **        ** Educated patient on anatomy, trigger point etiology, expectations for TDN (bruising, soreness, etc), outcomes, and recommendations for exercise. **      Therapeutic Exercise and NMR EXR  [x] (99786) Provided verbal/tactile cueing for activities related to strengthening, flexibility, endurance, ROM  for improvements in cervical, postural, scapular, scapulothoracic and UE control with self care, reaching, carrying, lifting, house/yardwork, driving/computer work.    [] (84374) Provided verbal/tactile cueing for activities related to improving balance, coordination, kinesthetic sense, posture, motor skill, proprioception  to assist with cervical, scapular, scapulothoracic and UE control with self care, reaching, carrying, lifting, house/yardwork, driving/computer work. Therapeutic Activities:    [] (86633 or 05175) Provided verbal/tactile cueing for activities related to improving balance, coordination, kinesthetic sense, posture, motor skill, proprioception and motor activation to allow for proper function of cervical, scapular, scapulothoracic and UE control with self care, carrying, lifting, driving/computer work.      Home Exercise Program:    [x] (46779) Reviewed/Progressed HEP activities related to strengthening, flexibility, endurance, ROM of cervical, scapular, scapulothoracic and UE control with self care, reaching, carrying, lifting, house/yardwork, driving/computer work  [] (68208) Reviewed/Progressed HEP activities related to improving balance, coordination, kinesthetic sense, posture, motor skill, proprioception of cervical, scapular, scapulothoracic and UE control with self care, reaching, carrying, lifting, house/yardwork, driving/computer work      Manual Treatments:  PROM / STM / Oscillations-Mobs:  G-I, II, III, IV (PA's, Inf., Post.)  [x] (08420) Provided manual therapy to mobilize soft tissue/joints of cervical/CT, scapular GHJ and UE for the purpose of decreasing headache, modulating pain, promoting relaxation,  increasing ROM, reducing/eliminating soft tissue swelling/inflammation/restriction, improving soft tissue extensibility and allowing for proper ROM for normal function with self care, reaching, carrying, lifting, house/yardwork, driving/computer work    Modalities:     [] GR/ESU    [] GR    [] ESU     [] CP    [] MHP    [x] declined   PM L UT to neck, R UT to neck    Charges:  Timed Code Treatment Minutes: 40   Total Treatment Minutes: 45     [] EVAL (LOW) 32684 (typically 20 minutes face-to-face)  [] EVAL (MOD) 19058 (typically 30 minutes face-to-face)  [] EVAL (HIGH) 49469 (typically 45 minutes face-to-face)  [] RE-EVAL     [x] HC(08512) x 2    [] IONTO  [] NMR (63680) x     [] VASO  [x] Manual (75408) x 1     [] Other:  [] TA x      [] Mech Traction (54571)  [] ES(attended) (90596)      [] ES (un) (36168): Trigger point Dry Needling:  fine needle insertion into myofascial trigger points to stimulate a healing response  [] (15423) Needle insertion without injection: 1 or 2 muscles  [] (96283) Needle insertion without injection: 3 or more muscles    GOALS: Patient stated goal: Get back to walking, caring for children with less pain. [x] Progressing: [] Met: [] Not Met: [] Adjusted    Therapist goals for Patient:   Short Term Goals: To be achieved in: 2 weeks  1. Independent in HEP and progression per patient tolerance, in order to prevent re-injury. [x] Progressing: [] Met: [] Not Met: [] Adjusted  2. Patient will have a decrease in pain to facilitate improvement in movement, function, and ADLs as indicated by Functional Deficits. [x] Progressing: [] Met: [] Not Met: [] Adjusted    Long Term Goals: To be achieved in: 12 weeks  1.  Disability index score of 30% or less for the NDI to assist with reaching prior level of function. [x] Progressing: [] Met: [] Not Met: [] Adjusted  2. Patient will demonstrate increased AROM to [facility] of cervical/thoracic spine to allow for proper joint functioning as indicated by patients Functional Deficits. [x] Progressing: [] Met: [] Not Met: [] Adjusted  3. Patient will demonstrate an increase in postural awareness and control and activation of  Deep cervical stabilizers to allow for proper functional mobility as indicated by patients Functional Deficits. [x] Progressing: [] Met: [] Not Met: [] Adjusted   4. Patient will return to walking 1 mile without increased symptoms or restriction. [x] Progressing: [] Met: [] Not Met: [] Adjusted  5. Patient will be able to carry 3year old for 20+ mins at a time without increased symptoms. [x] Progressing: [] Met: [] Not Met: [] Adjusted        Overall Progression Towards Functional goals/ Treatment Progress Update:  [] Patient is progressing as expected towards functional goals listed. [] Progression is slowed due to complexities/Impairments listed. [] Progression has been slowed due to co-morbidities. [x] Plan just implemented, too soon to assess goals progression <30days   [] Goals require adjustment due to lack of progress  [] Patient is not progressing as expected and requires additional follow up with physician  [] Other    Prognosis for POC: [x] Good [] Fair  [] Poor      Patient requires continued skilled intervention: [x] Yes  [] No      ASSESSMENT:  Requires cueing to reduce UT compensation. Fatigued at end of session. Will benefit from continued skilled PT to reduce tissue tension. Increase cervical mobility, increase cervical/postural strength and awareness to reduce symptoms and risk of re-injury.     Treatment/Activity Tolerance:  [x] Patient tolerated treatment well [] Patient limited by fatique  [] Patient limited by pain  [] Patient limited by other medical complications  [] Other:     Patient Requires Follow-up: [x] Yes  [] No    PLAN: See eval  [] Continue per plan of care [] Alter current plan (see comments above)  [x] Plan of care initiated [] Hold pending MD visit [] Discharge      Electronically signed by:  Beto Ramsay PT , DPT [id number]    Note: If patient does not return for scheduled/ recommended follow up visits, this note will serve as a discharge from care along with most recent update on progress.

## 2020-09-24 ENCOUNTER — HOSPITAL ENCOUNTER (OUTPATIENT)
Dept: PHYSICAL THERAPY | Age: 35
Setting detail: THERAPIES SERIES
Discharge: HOME | End: 2020-09-24
Payer: MEDICAID

## 2020-09-24 NOTE — FLOWSHEET NOTE
[facility], [address]        Physical Therapy  Cancellation/No-show Note  Patient Name:  Yg Pacheco  :  1985   Date:  2020  Cancelled visits to date: 1  No-shows to date: 3    For today's appointment patient:  ?  Cancelled  ? Rescheduled appointment  ? X  No-show     Reason given by patient:  ?  Patient ill  ? Conflicting appointment  ? No transportation    ? Conflict with work  ? No reason given  ?   Other:     Comments:  No answer, last scheduled appt    Electronically signed by:  Sandy Allan PT

## 2020-11-09 ENCOUNTER — HOSPITAL ENCOUNTER (OUTPATIENT)
Age: 35
Discharge: HOME | End: 2020-11-09
Payer: MEDICAID

## 2020-11-09 LAB
BACTERIA: ABNORMAL /HPF
EPITHELIAL CELLS, UA: ABNORMAL /HPF (ref 0–5)
RBC UA: ABNORMAL /HPF (ref 0–4)
URINE TYPE: ABNORMAL
WBC UA: ABNORMAL /HPF (ref 0–5)

## 2020-11-09 PROCEDURE — 80069 RENAL FUNCTION PANEL: CPT

## 2020-11-09 PROCEDURE — 82570 ASSAY OF URINE CREATININE: CPT

## 2020-11-09 PROCEDURE — 87088 URINE BACTERIA CULTURE: CPT

## 2020-11-09 PROCEDURE — 82043 UR ALBUMIN QUANTITATIVE: CPT

## 2020-11-09 PROCEDURE — 36415 COLL VENOUS BLD VENIPUNCTURE: CPT

## 2020-11-09 PROCEDURE — 87086 URINE CULTURE/COLONY COUNT: CPT

## 2020-11-09 PROCEDURE — 87077 CULTURE AEROBIC IDENTIFY: CPT

## 2020-11-09 PROCEDURE — 87186 SC STD MICRODIL/AGAR DIL: CPT

## 2020-11-09 PROCEDURE — 81015 MICROSCOPIC EXAM OF URINE: CPT

## 2020-11-10 LAB
ALBUMIN SERPL-MCNC: 4.1 G/DL (ref 3.4–5)
ANION GAP SERPL CALCULATED.3IONS-SCNC: 9 MMOL/L (ref 3–16)
BUN BLDV-MCNC: 17 MG/DL (ref 7–20)
CALCIUM SERPL-MCNC: 9.3 MG/DL (ref 8.3–10.6)
CHLORIDE BLD-SCNC: 102 MMOL/L (ref 99–110)
CO2: 26 MMOL/L (ref 21–32)
CREAT SERPL-MCNC: 0.6 MG/DL (ref 0.6–1.1)
CREATININE URINE: 101.7 MG/DL (ref 28–259)
GFR AFRICAN AMERICAN: >60
GFR NON-AFRICAN AMERICAN: >60
GLUCOSE BLD-MCNC: 76 MG/DL (ref 70–99)
MICROALBUMIN UR-MCNC: <1.2 MG/DL
MICROALBUMIN/CREAT UR-RTO: NORMAL MG/G (ref 0–30)
PHOSPHORUS: 3.3 MG/DL (ref 2.5–4.9)
POTASSIUM SERPL-SCNC: 4.3 MMOL/L (ref 3.5–5.1)
SODIUM BLD-SCNC: 137 MMOL/L (ref 136–145)

## 2020-11-11 LAB
ORGANISM: ABNORMAL
URINE CULTURE, ROUTINE: ABNORMAL

## 2021-01-26 ENCOUNTER — OFFICE VISIT (OUTPATIENT)
Dept: ORTHOPEDIC SURGERY | Age: 36
End: 2021-01-26
Payer: MEDICAID

## 2021-01-26 VITALS — BODY MASS INDEX: 34.96 KG/M2 | HEIGHT: 62 IN | WEIGHT: 190 LBS | TEMPERATURE: 97.1 F

## 2021-01-26 DIAGNOSIS — R07.89 STERNAL PAIN: Primary | ICD-10-CM

## 2021-01-26 DIAGNOSIS — M79.18 CERVICAL MYOFASCIAL PAIN SYNDROME: ICD-10-CM

## 2021-01-26 DIAGNOSIS — M12.219: ICD-10-CM

## 2021-01-26 DIAGNOSIS — M54.2 NECK PAIN: ICD-10-CM

## 2021-01-26 DIAGNOSIS — M94.0 COSTOCHONDRITIS: ICD-10-CM

## 2021-01-26 PROCEDURE — 4004F PT TOBACCO SCREEN RCVD TLK: CPT | Performed by: FAMILY MEDICINE

## 2021-01-26 PROCEDURE — G8417 CALC BMI ABV UP PARAM F/U: HCPCS | Performed by: FAMILY MEDICINE

## 2021-01-26 PROCEDURE — G8484 FLU IMMUNIZE NO ADMIN: HCPCS | Performed by: FAMILY MEDICINE

## 2021-01-26 PROCEDURE — G8427 DOCREV CUR MEDS BY ELIG CLIN: HCPCS | Performed by: FAMILY MEDICINE

## 2021-01-26 PROCEDURE — 99214 OFFICE O/P EST MOD 30 MIN: CPT | Performed by: FAMILY MEDICINE

## 2021-01-26 RX ORDER — PREDNISONE 20 MG/1
TABLET ORAL
Qty: 20 TABLET | Refills: 0 | Status: SHIPPED | OUTPATIENT
Start: 2021-01-26 | End: 2021-03-10 | Stop reason: ALTCHOICE

## 2021-01-26 NOTE — PROGRESS NOTES
Chief Complaint  Neck Pain (CK NECK)      Initial evaluation of left greater than right sternoclavicular and costochondral pain with history of cervical pain with low-grade spondylolysis and noncompressive disc protrusions C5-6 and C6-7 with spondylolysis with previous negative left upper extremity EMG persistent episodic but improving right forearm and hand ulnar numbness    History of Present Illness:  Connie Lockwood is a 28 y.o. 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 Mark Lennon, he does have a history of rheumatoid arthritis and started treatment many years ago out in [facility] states that her cervical spine is been bothering her for at least 10+ years which she was attributed to her rheumatoid arthritis. Once again she was previously established from a rheumatologic standpoint however lost her rheumatologist [facility] [place] closed. She was being treated appropriately including trials of methotrexate but was ultimately taken off of this and she has not had any type of rheumatologic treatment for a number of years. She states that her cervical symptoms have worsened substantially over the past several months since roughly April 2020 but is become much worse since early July 2020. There is no history of injury or fall and she does have considerable stiffness and pain to the cervical spine radiating around to the left greater than right sternoclavicular region but more recently has been having numbness and tingling in the left arm in the C7/T1 distribution. There is once again no history of actual injury or new activity prior to becoming symptomatic.   She does have pain ranging between a 2 up to about an 8 out of 10 and does seem to be worse at night. She has tried 1 session of medical massage which is helped loosen up her trapezius and she does have some extension to the periscapular region. Cervical rotation and extension does aggravate her symptoms as does housework and yard work. She has tried alternating Motrin and Tylenol with limited success. She has noticed possibly some weakness into her hand as well as numbness and tingling. She has noted that she will have to shake her hands awake in the morning and with driving. She is being seen today for orthopedic and sports consultation with imaging. She was initially evaluated in office on 7/20/2020 and given her exam findings and chronicity of her symptoms, we did send her for cervical MRI which was obtained at [facility] on 7/29/2020 and did show evidence of straightening about the cervical spine consistent with muscle spasm and some low-grade spondylolysis and noncompressive disc protrusions at C5-6 and C6-7 with spondylitic change. There is not appear to be high-grade foraminal narrowing or high-grade disc herniation. We had held off formal therapy pending the results of her MRI and she is actually set up for her EMGs with Dr. Lamont John of her upper extremities tomorrow on 8/4/2020. She did not get much in the way of benefit from her Medrol pack but has tolerated her diclofenac. She is feeling about the same as her initial evaluation a couple weeks ago. Denies focal weakness. She has most of her discomfort at night. She actually has seen Dr. Jerry Moon and is in the process of her rheumatologic work-up once again. She was last seen in the office on 8/3/2020 reviewed her cervical MRI. She was continued on conservative treatment and did have her follow-up EMG of the left upper extremity on 8/4/2020. This did not show any evidence of carpal tunnel or obvious signs of plexopathy or radiculopathy.   She once again is under the care of Dr. Valerie Acevedo and is radicular symptoms and she is now only having episodic upper extremity numbness and tingling on the left. Once again her EMG was negative last year. .    Medical History    Patient's medications, allergies, past medical, surgical, social and family histories were reviewed and updated as appropriate. Review of Systems     Relevant review of systems reviewed 7/20/2020 and available in the patient's chart    Vital Signs    Vitals:    01/26/21 1347   Temp: 97.1 °F (36.2 °C)       General Exam:      Constitutional: Patient is adequately groomed with no evidence of malnutrition  DTRs: Deep tendon reflexes are intact  Mental Status: The patient is oriented to time, place and person. The patient's mood and affect are appropriate. Vascular: Examination reveals no swelling or calf tenderness. Peripheral pulses are palpable and 2+. Neurological: The patient has good coordination. There is no weakness or sensory deficit. Cervical Spine Examination    Inspection: There is no high-grade deformity soft tissue swelling or palpable spasm. No masses. Palpation: She does have mild tenderness along the cervical paraspinals and lower cervical facets with left greater than right trapezial trigger points and less tenderness over the superior rhomboid. She does have mild right and moderate left sternoclavicular discomfort to palpation mildly left and right side without high-grade deformity. She does have some tenderness over the costochondral border of the upper sternum left greater than right without soft tissue swelling or deformity. Rang of Motion: She does have reasonable cervical motion. Strength: I do not see definitive evidence of focal upper extremity motor deficits and her cervical isometric strength testing appears intact. Special Tests: She does not exhibit significant pain with axial load testing. Negative Spurling's on the left and negative on the right.   Once again no focal upper extremity motor deficits noted. She does appear to have a negative Tinel's at the carpal tunnel left greater than right with negative cubital tunnel Tinel's and no evidence of thenar atrophy. Skin: There are no rashes, ulcerations or lesions. Distal motor sensory and vascular exams intact. Gait: Reasonably fluid gait. Additional Comments:            Additional Examinations:      Right Upper Extremity:  Examination of the right upper extremity does not show any tenderness, deformity or injury. Range of motion is unremarkable. There is no gross instability. There are no rashes, ulcerations or lesions. Strength and tone are normal.  Left Upper Extremity: Examination of the left upper extremity does not show any tenderness, deformity or injury. Range of motion is unremarkable. There is no gross instability. There are no rashes, ulcerations or lesions. Strength and tone are normal.              Diagnostic Test Findings:    Cervical MRI performed at [address] [place] [address] on 2020 as listed above     Narrative    Site: [facility] #: [id number] #: [id number] Procedure: MR Cervical Spine w/o Contrast ; Reason for Exam: Neck pain. Strain of neck muscle, initial encounter. Cervical myofascial pain syndrome. Left arm numbness    This document is confidential medical information.  Unauthorized disclosure or use of this information is prohibited by law. If you are not the intended recipient of this document, please advise us by calling immediately [phone].         [facility]    NI[address]              Patient Name: Vianey Ulrich    Case ID: [id number]    Patient : 1985    Referring Physician: Nimo Iniguez MD    Exam Date: 2020    Exam Description: MR Cervical Spine w/o Contrast              HISTORY:   Neck pain.  Pain medial right clavicle.         TECHNICAL FACTORS:  Long- and short-axis fat- and water-weighted images were performed.      COMPARISON:  None.         FINDINGS:   No prevertebral soft tissue swelling or fracture.  Straightening of spine    presumably represents muscle spasm.         The craniocervical junction and cervical cord normal.         Minor spondylosis without substantial disc space narrowing.         Findings at individual levels demonstrate:         C2-3, C3-4, C4-5 unremarkable.         C5-6 shallow disc osteophyte complex with no neural compression.         C6-7 shallow central disc osteophyte complex with no neural compression.         C7-T1 unremarkable.         Visualized soft tissues of the neck normal.         CONCLUSION:    Muscle spasm and minor spondylosis.  No compressive abnormality in the cervical spine.         Thank you for the opportunity to provide your interpretation.                   Mily Oliveira MD         A: PC 07/29/2020 4:42 PM           Negative left upper extremity EMG per Dr. Dashawn Lyon 8/4/2020    We did do an AP and lateral sternal x-ray with sternoclavicular views today which does not show any evidence of acute osseous injury or obvious erosive changes to the sternal clavicular joints. Assessment: #1. Roughly 4 weeks status post suspected left greater than right sternoclavicular synovitis with costochondritis. #2 mild recurrent cervical pain with cervical myofascial pain with history of low-grade cervical spondylolysis and no evidence of compressive discopathy. #2.  Markedly improved left upper extremity numbness and tingling with negative left upper extremity EMG 8/4/2020  #.3. Chronic effectively untreated rheumatoid arthritis previously seen by Dr. Kirby Rivero:       Encounter Diagnoses   Name Primary?     Sternal pain Yes    Villonodular synovitis involving sternoclavicular joint, unspecified laterality     Costochondritis     Cervical myofascial pain syndrome     Neck pain        Office Procedures:       Orders Placed This Encounter   Procedures    XR STERNUM (MIN 2 VIEWS)     Standing Status:   Future     Number of Occurrences:   1     Standing Expiration Date:   1/26/2022    Ambulatory referral to Physical Therapy     Referral Priority:   Routine     Referral Type:   Eval and Treat     Referral Reason:   Specialty Services Required     Requested Specialty:   Physical Therapy     Number of Visits Requested:   1       Treatment Plan:  Treatment options were discussed with Dago Scott. We did review her plain films, recent cervical MRI and exam findings as well as her recent negative left upper extremity EMG. She has developed sternoclavicular and what sounds like costochondritis symptoms over the past 4 weeks or so. There is no history of injury or trauma. She was found on work-up last year to have some minor cervical spondylolysis with cervical myofascial pain and and was in the process of being worked up for rheumatologic disorders per Dr. Ibeth Cabello prior to his unfortunate death. He tentatively diagnosed her with fibromyalgia and history of rheumatoid. .  Place her on a 13-day prednisone taper and fortunately have some recent renal dysfunction noted and was told to avoid her Lodine. We will subsequently follow her prednisone taper by Voltaren gel 2 grams 4 times daily to the sternoclavicular joints and costochondral border. More recently she has begun to notice tightness again to her left trap and cervical paraspinals and I think she would benefit from seeing a therapist for some manual techniques once again as a stretching has not been effective for her. She responded very well dry needling last year. I would certainly like for her to get reestablished with rheumatology on the [facility] side [facility] as unfortunately Dr. Ibeth Cabello did pass away. We will see her back in [facility] office in about 6 weeks but I am suspicious that this is more of a rheumatologic or fibromyalgia type syndrome than truly orthopedic.   Icing and activity modification was discussed. She will contact us in the interim with questions or concerns. This dictation was performed with a verbal recognition program (DRAGON) and it was checked for errors. It is possible that there are still dictated errors within this office note. If so, please bring any errors to my attention for an addendum. All efforts were made to ensure that this office note is accurate.

## 2021-02-03 ENCOUNTER — TELEPHONE (OUTPATIENT)
Dept: ORTHOPEDIC SURGERY | Age: 36
End: 2021-02-03

## 2021-02-03 DIAGNOSIS — M79.18 CERVICAL MYOFASCIAL PAIN SYNDROME: ICD-10-CM

## 2021-02-03 DIAGNOSIS — M94.0 COSTOCHONDRITIS: ICD-10-CM

## 2021-02-03 DIAGNOSIS — R07.89 STERNAL PAIN: ICD-10-CM

## 2021-02-03 DIAGNOSIS — S16.1XXA STRAIN OF NECK MUSCLE, INITIAL ENCOUNTER: ICD-10-CM

## 2021-02-03 DIAGNOSIS — M12.219: Primary | ICD-10-CM

## 2021-03-10 ENCOUNTER — OFFICE VISIT (OUTPATIENT)
Dept: ORTHOPEDIC SURGERY | Age: 36
End: 2021-03-10
Payer: MEDICAID

## 2021-03-10 VITALS — WEIGHT: 196 LBS | BODY MASS INDEX: 34.73 KG/M2 | HEIGHT: 63 IN

## 2021-03-10 DIAGNOSIS — S16.1XXA STRAIN OF NECK MUSCLE, INITIAL ENCOUNTER: ICD-10-CM

## 2021-03-10 DIAGNOSIS — M54.2 NECK PAIN: ICD-10-CM

## 2021-03-10 DIAGNOSIS — M79.18 CERVICAL MYOFASCIAL PAIN SYNDROME: ICD-10-CM

## 2021-03-10 DIAGNOSIS — M12.219: Primary | ICD-10-CM

## 2021-03-10 DIAGNOSIS — M94.0 COSTOCHONDRITIS: ICD-10-CM

## 2021-03-10 DIAGNOSIS — R07.89 STERNAL PAIN: ICD-10-CM

## 2021-03-10 PROCEDURE — G8484 FLU IMMUNIZE NO ADMIN: HCPCS | Performed by: FAMILY MEDICINE

## 2021-03-10 PROCEDURE — G8417 CALC BMI ABV UP PARAM F/U: HCPCS | Performed by: FAMILY MEDICINE

## 2021-03-10 PROCEDURE — G8427 DOCREV CUR MEDS BY ELIG CLIN: HCPCS | Performed by: FAMILY MEDICINE

## 2021-03-10 PROCEDURE — 4004F PT TOBACCO SCREEN RCVD TLK: CPT | Performed by: FAMILY MEDICINE

## 2021-03-10 PROCEDURE — 99214 OFFICE O/P EST MOD 30 MIN: CPT | Performed by: FAMILY MEDICINE

## 2021-03-10 PROCEDURE — 20605 DRAIN/INJ JOINT/BURSA W/O US: CPT | Performed by: FAMILY MEDICINE

## 2021-03-10 RX ORDER — BUPIVACAINE HYDROCHLORIDE 2.5 MG/ML
1 INJECTION, SOLUTION INFILTRATION; PERINEURAL ONCE
Status: COMPLETED | OUTPATIENT
Start: 2021-03-10 | End: 2021-03-10

## 2021-03-10 RX ORDER — ACYCLOVIR 400 MG/1
TABLET ORAL
COMMUNITY
Start: 2021-01-12

## 2021-03-10 RX ORDER — BETAMETHASONE SODIUM PHOSPHATE AND BETAMETHASONE ACETATE 3; 3 MG/ML; MG/ML
6 INJECTION, SUSPENSION INTRA-ARTICULAR; INTRALESIONAL; INTRAMUSCULAR; SOFT TISSUE ONCE
Status: COMPLETED | OUTPATIENT
Start: 2021-03-10 | End: 2021-03-10

## 2021-03-10 RX ORDER — TIZANIDINE 4 MG/1
TABLET ORAL
COMMUNITY
Start: 2021-01-12

## 2021-03-10 RX ADMIN — BETAMETHASONE SODIUM PHOSPHATE AND BETAMETHASONE ACETATE 6 MG: 3; 3 INJECTION, SUSPENSION INTRA-ARTICULAR; INTRALESIONAL; INTRAMUSCULAR; SOFT TISSUE at 09:52

## 2021-03-10 RX ADMIN — BUPIVACAINE HYDROCHLORIDE 2.5 MG: 2.5 INJECTION, SOLUTION INFILTRATION; PERINEURAL at 09:52

## 2021-03-10 NOTE — PROGRESS NOTES
Chief Complaint  Neck Pain (CK NECK)      Follow-up left greater than right sternoclavicular and costochondral pain with history of cervical pain with low-grade spondylolysis and noncompressive disc protrusions C5-6 and C6-7 with spondylolysis with previous negative left upper extremity EMG persistent episodic but improving right forearm and hand ulnar numbness    History of Present Illness:  Aundrea Croft is a 28 y.o. 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 Jess Mcclellan, he does have a history of rheumatoid arthritis and started treatment many years ago out in [facility] states that her cervical spine is been bothering her for at least 10+ years which she was attributed to her rheumatoid arthritis. Once again she was previously established from a rheumatologic standpoint however lost her rheumatologist [facility] [place] [facility] [place] closed. She was being treated appropriately including trials of methotrexate but was ultimately taken off of this and she has not had any type of rheumatologic treatment for a number of years. She states that her cervical symptoms have worsened substantially over the past several months since roughly April 2020 but is become much worse since early July 2020. There is no history of injury or fall and she does have considerable stiffness and pain to the cervical spine radiating around to the left greater than right sternoclavicular region but more recently has been having numbness and tingling in the left arm in the C7/T1 distribution. There is once again no history of actual injury or new activity prior to becoming symptomatic.   She does have pain ranging between a 2 up to about an 8 out of 10 and does seem to be worse at night. She has tried 1 session of medical massage which is helped loosen up her trapezius and she does have some extension to the periscapular region. Cervical rotation and extension does aggravate her symptoms as does housework and yard work. She has tried alternating Motrin and Tylenol with limited success. She has noticed possibly some weakness into her hand as well as numbness and tingling. She has noted that she will have to shake her hands awake in the morning and with driving. She is being seen today for orthopedic and sports consultation with imaging. She was initially evaluated in office on 7/20/2020 and given her exam findings and chronicity of her symptoms, we did send her for cervical MRI which was obtained at [facility] on 7/29/2020 and did show evidence of straightening about the cervical spine consistent with muscle spasm and some low-grade spondylolysis and noncompressive disc protrusions at C5-6 and C6-7 with spondylitic change. There is not appear to be high-grade foraminal narrowing or high-grade disc herniation. We had held off formal therapy pending the results of her MRI and she is actually set up for her EMGs with Dr. Nirmala Grullon of her upper extremities tomorrow on 8/4/2020. She did not get much in the way of benefit from her Medrol pack but has tolerated her diclofenac. She is feeling about the same as her initial evaluation a couple weeks ago. Denies focal weakness. She has most of her discomfort at night. She actually has seen Dr. Rogelio Clarke and is in the process of her rheumatologic work-up once again. She was last seen in the office on 8/3/2020 reviewed her cervical MRI. She was continued on conservative treatment and did have her follow-up EMG of the left upper extremity on 8/4/2020. This did not show any evidence of carpal tunnel or obvious signs of plexopathy or radiculopathy.   She once again is under the care of Dr. Zeeshan Ortiz and is being worked up for her rheumatologic conditions as well. She is continue with her anti-inflammatories. She has not tried cock-up splints. She is scheduled to begin physical therapy this coming Friday. She once again continues to have most of her discomfort at night with some occasional numbness and tingling but on questioning her she will have to shake her hands awake at times. She is not truly complaining of high-grade radicular symptoms consistently although she does have multiple joint arthralgias. Annamarie Beatty was last seen in the office on 9/2/2020 was found on previous work-up to have very low-grade cervical spondylolysis with noncompressive disc protrusions at C5-6 and C6-7 with myofascial tightness. He had a negative upper extremity EMG. She was subsequently in the process of going through a formal rheumatologic work-up with Dr. Darren Quiles before he unfortunately did pass away. She was tentatively diagnosed with history of rheumatoid arthritis as well as fibromyalgia. She apparently did have some issues with kidney function and was told to stop her anti-inflammatories. She states that just after Christmas of 2020, she began noticed soreness and achiness to the sternoclavicular joints and sternum and costochondral borders left greater than right. There was no history of actual injury or trauma prior to becoming symptomatic nor was there any history of upper respiratory illness or viral syndromes. She may have noticed some mild swelling and was complaining of some heaviness and did have a work-up with her primary care physicians. She was initially placed on a 6-day Medrol Dosepak with a substantial 80% improvement for the first few days while on the steroids. As she tapered off of this her symptoms did worsen once again and now she is having some tightness to the left side of her cervical spine. She is denying high-grade radicular symptoms and is seeing us once again today for evaluation.   Denies radicular symptoms and she is now only having episodic upper extremity numbness and tingling on the left. Once again her EMG was negative last year. Seferino Taylor was last seen in the office on 1/26/2021 primarily for worsening suspected synovitis of the left greater than right sternoclavicular joint as well as some costochondritis. She does have a longstanding history of cervical myofascial pain and has been working with her home stretching program.  Apparently she does have some kidney dysfunction and was told to stop her anti-inflammatories and when we saw her in January 2021, she did get a transient 60 to 70% improvement with her prednisone taper but her symptoms have progressed to the point where she is still having pain with active use of her left shoulder but localized majority of discomfort to the left SC joint. She has having some left shoulder stiffness as well as her baseline cervical discomfort and has been compliant with her home-based exercise program.  She has occasional been utilizing her Voltaren gel. Once again she does have an appointment to see Dr. Chilango Yancey at [facility] rheumatology but her appointment is not until May 2021. Again she was told she did have rheumatoid in the past but is never had Biologics or disease modifying medications. Denies true radicular symptoms. Medical History    Patient's medications, allergies, past medical, surgical, social and family histories were reviewed and updated as appropriate. Review of Systems     Relevant review of systems reviewed 7/20/2020 and available in the patient's chart    Vital Signs    There were no vitals filed for this visit. General Exam:      Constitutional: Patient is adequately groomed with no evidence of malnutrition  DTRs: Deep tendon reflexes are intact  Mental Status: The patient is oriented to time, place and person. The patient's mood and affect are appropriate. Vascular: Examination reveals no swelling or calf tenderness. unremarkable. There is no gross instability. There are no rashes, ulcerations or lesions. Strength and tone are normal.              Diagnostic Test Findings:    Cervical MRI performed at [facility] on 2020 as listed above     Narrative    Site: [facility] #: [id number] #: [id number] Procedure: MR Cervical Spine w/o Contrast ; Reason for Exam: Neck pain. Strain of neck muscle, initial encounter. Cervical myofascial pain syndrome. Left arm numbness    This document is confidential medical information.  Unauthorized disclosure or use of this information is prohibited by law. If you are not the intended recipient of this document, please advise us by calling immediately [phone].         [facility]    [address]              Patient Name: Mitchel Camara    Case ID: [id number]    Patient : 1985    Referring Physician: Barron Barry MD    Exam Date: 2020    Exam Description: MR Cervical Spine w/o Contrast              HISTORY:   Neck pain.  Pain medial right clavicle.         TECHNICAL FACTORS:  Long- and short-axis fat- and water-weighted images were performed.         COMPARISON:  None.         FINDINGS:   No prevertebral soft tissue swelling or fracture.  Straightening of spine    presumably represents muscle spasm.         The craniocervical junction and cervical cord normal.         Minor spondylosis without substantial disc space narrowing.         Findings at individual levels demonstrate:         C2-3, C3-4, C4-5 unremarkable.         C5-6 shallow disc osteophyte complex with no neural compression.         C6-7 shallow central disc osteophyte complex with no neural compression.         C7-T1 unremarkable.         Visualized soft tissues of the neck normal.         CONCLUSION:    Muscle spasm and minor spondylosis.  No compressive abnormality in the cervical spine.         Thank you for the opportunity to provide your interpretation.                Dominga Fisher MD         A: OSIEL 07/29/2020 4:42 PM           Negative left upper extremity EMG per Dr. Mary Richey 8/4/2020    We did do an AP and lateral sternal x-ray with sternoclavicular views previously in the office on 1/26/2021 which does not show any evidence of acute osseous injury or obvious erosive changes to the sternal clavicular joints. Assessment: #1. Roughly 10 weeks status post suspected left greater than right sternoclavicular synovitis with costochondritis. #2 mild recurrent cervical pain with cervical myofascial pain with history of low-grade cervical spondylolysis and no evidence of compressive discopathy. #2.  Markedly improved left upper extremity numbness and tingling with negative left upper extremity EMG 8/4/2020  #.3. Chronic effectively untreated rheumatoid arthritis previously seen by Dr. Tsering Jolly with upcoming consultation with rheumatology Dr. Luis M Berry in May 2021       Impression:       Encounter Diagnoses   Name Primary?  Villonodular synovitis involving sternoclavicular joint, unspecified laterality Yes    Cervical myofascial pain syndrome     Sternal pain     Costochondritis     Strain of neck muscle, initial encounter     Neck pain        Office Procedures:       Orders Placed This Encounter   Procedures    MRI CHEST WO CONTRAST     Standing Status:   Future     Standing Expiration Date:   3/10/2022     Scheduling Instructions:      *[facility] JOINT            [facility] [address] [facility] #:      TIME AND DATE TBD      PLEASE CALL PATIENT ONCE APPROVED TO SCHEDULE       PUSH TO [employer] PACS SYSTEM            Remember that it may take several business days to pre-cert your MRI through your insurance. Our office will contact you as soon as we have the approval. We will not give any test results over the phone.  Please call [phone] once you have your test day and time to schedule a follow up with Dr. Arpit Steinberg. Order Specific Question:   Reason for exam:     Answer:   ATTN SC JOINT R/O SC SYNOVITIS    20605 - IN DRAIN/INJECT INTERMEDIATE JOINT/BURSA       Treatment Plan:  Treatment options were discussed with Jeremias Guallpa. We did review her plain films, recent cervical MRI and exam findings as well as her recent negative left upper extremity EMG. She has developed sternoclavicular and what sounds like costochondritis symptoms over the past 10 weeks or so. There is no history of injury or trauma. She was found on work-up last year to have some minor cervical spondylolysis with cervical myofascial pain and and was in the process of being worked up for rheumatologic disorders per Dr. Bishnu Huber prior to his unfortunate death. She did get transient improvement with her prednisone taper over the sternoclavicular joints but this is helped in resolving her costochondral discomfort. I recommend she continues with her Voltaren gel 4 g 4 times daily to her sternoclavicular joint we will set her up for an MRI of this region to evaluate for erosive changes and/or stress injury to the left sternoclavicular joint. We also performed a local steroid injection using 1 cc of Celestone, 1 cc of Marcaine overlying the left SC joint to see if this gives her some benefit with her substantial improvement following her 13-day prednisone taper. I did inform her that this could very well be more systemic in nature related to her previous diagnosis of rheumatoid and was encouraged to keep her appointment with the rheumatologist at [facility] Dr. Wally Mcelroy which she is set up for May 2021. We will see her back post MRI of the sternum. Icing and activity modification was discussed. She will contact us in the interim with questions or concerns. This dictation was performed with a verbal recognition program (DRAGON) and it was checked for errors.  It is possible that there are still dictated errors within this office note. If so, please bring any errors to my attention for an addendum. All efforts were made to ensure that this office note is accurate.

## 2021-03-15 ENCOUNTER — TELEPHONE (OUTPATIENT)
Dept: ORTHOPEDIC SURGERY | Age: 36
End: 2021-03-15

## 2021-03-15 NOTE — TELEPHONE ENCOUNTER
Spoke to patient and informed them that their MRI has been authorized and that they can call and schedule scan at their convenience. Also told them that they can call and schedule a f/u with Dr. Clint Moody once they have MRI scheduled, leaving at least 2-3 days for our office to receive their results. yes

## 2021-03-24 ENCOUNTER — OFFICE VISIT (OUTPATIENT)
Dept: ORTHOPEDIC SURGERY | Age: 36
End: 2021-03-24
Payer: MEDICAID

## 2021-03-24 VITALS — BODY MASS INDEX: 34.73 KG/M2 | HEIGHT: 63 IN | WEIGHT: 196 LBS

## 2021-03-24 DIAGNOSIS — M54.2 NECK PAIN: ICD-10-CM

## 2021-03-24 DIAGNOSIS — M94.0 COSTOCHONDRITIS: ICD-10-CM

## 2021-03-24 DIAGNOSIS — S16.1XXA STRAIN OF NECK MUSCLE, INITIAL ENCOUNTER: ICD-10-CM

## 2021-03-24 DIAGNOSIS — R07.89 STERNAL PAIN: ICD-10-CM

## 2021-03-24 DIAGNOSIS — M12.219: Primary | ICD-10-CM

## 2021-03-24 DIAGNOSIS — M79.18 CERVICAL MYOFASCIAL PAIN SYNDROME: ICD-10-CM

## 2021-03-24 PROCEDURE — 4004F PT TOBACCO SCREEN RCVD TLK: CPT | Performed by: FAMILY MEDICINE

## 2021-03-24 PROCEDURE — G8427 DOCREV CUR MEDS BY ELIG CLIN: HCPCS | Performed by: FAMILY MEDICINE

## 2021-03-24 PROCEDURE — 99213 OFFICE O/P EST LOW 20 MIN: CPT | Performed by: FAMILY MEDICINE

## 2021-03-24 PROCEDURE — G8417 CALC BMI ABV UP PARAM F/U: HCPCS | Performed by: FAMILY MEDICINE

## 2021-03-24 PROCEDURE — G8484 FLU IMMUNIZE NO ADMIN: HCPCS | Performed by: FAMILY MEDICINE

## 2021-03-24 RX ORDER — PREDNISONE 20 MG/1
TABLET ORAL
Qty: 20 TABLET | Refills: 0 | Status: SHIPPED | OUTPATIENT
Start: 2021-03-24

## 2021-03-24 NOTE — PROGRESS NOTES
Chief Complaint  Neck Pain (TR MRI CHEST)      Follow-up left greater than right sternoclavicular and costochondral pain with history of cervical pain with low-grade spondylolysis and noncompressive disc protrusions C5-6 and C6-7 with spondylolysis with previous negative left upper extremity EMG persistent episodic but improving right forearm and hand ulnar numbness. Review of left sternoclavicular MRI    History of Present Illness:  Richard Holly is a 28 y.o. 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 Anmol Pink, he does have a history of rheumatoid arthritis and started treatment many years ago out in [facility] states that her cervical spine is been bothering her for at least 10+ years which she was attributed to her rheumatoid arthritis. Once again she was previously established from a rheumatologic standpoint however lost her rheumatologist [facility] [place] closed. She was being treated appropriately including trials of methotrexate but was ultimately taken off of this and she has not had any type of rheumatologic treatment for a number of years. She states that her cervical symptoms have worsened substantially over the past several months since roughly April 2020 but is become much worse since early July 2020. There is no history of injury or fall and she does have considerable stiffness and pain to the cervical spine radiating around to the left greater than right sternoclavicular region but more recently has been having numbness and tingling in the left arm in the C7/T1 distribution. There is once again no history of actual injury or new activity prior to becoming symptomatic.   She does have pain ranging between a 2 up to about an 8 out of 10 and does seem to be worse at night. She has tried 1 session of medical massage which is helped loosen up her trapezius and she does have some extension to the periscapular region. Cervical rotation and extension does aggravate her symptoms as does housework and yard work. She has tried alternating Motrin and Tylenol with limited success. She has noticed possibly some weakness into her hand as well as numbness and tingling. She has noted that she will have to shake her hands awake in the morning and with driving. She is being seen today for orthopedic and sports consultation with imaging. She was initially evaluated in office on 7/20/2020 and given her exam findings and chronicity of her symptoms, we did send her for cervical MRI which was obtained at [facility] on 7/29/2020 and did show evidence of straightening about the cervical spine consistent with muscle spasm and some low-grade spondylolysis and noncompressive disc protrusions at C5-6 and C6-7 with spondylitic change. There is not appear to be high-grade foraminal narrowing or high-grade disc herniation. We had held off formal therapy pending the results of her MRI and she is actually set up for her EMGs with Dr. Kayleigh Putnma of her upper extremities tomorrow on 8/4/2020. She did not get much in the way of benefit from her Medrol pack but has tolerated her diclofenac. She is feeling about the same as her initial evaluation a couple weeks ago. Denies focal weakness. She has most of her discomfort at night. She actually has seen Dr. Lorna Gastelum and is in the process of her rheumatologic work-up once again. She was last seen in the office on 8/3/2020 reviewed her cervical MRI. She was continued on conservative treatment and did have her follow-up EMG of the left upper extremity on 8/4/2020. This did not show any evidence of carpal tunnel or obvious signs of plexopathy or radiculopathy.   She once again is under the care of Dr. Adrianne Larkin and is being worked up for her rheumatologic conditions as well. She is continue with her anti-inflammatories. She has not tried cock-up splints. She is scheduled to begin physical therapy this coming Friday. She once again continues to have most of her discomfort at night with some occasional numbness and tingling but on questioning her she will have to shake her hands awake at times. She is not truly complaining of high-grade radicular symptoms consistently although she does have multiple joint arthralgias. Supriya Gallego was last seen in the office on 9/2/2020 was found on previous work-up to have very low-grade cervical spondylolysis with noncompressive disc protrusions at C5-6 and C6-7 with myofascial tightness. He had a negative upper extremity EMG. She was subsequently in the process of going through a formal rheumatologic work-up with Dr. Adrianne Larkin before he unfortunately did pass away. She was tentatively diagnosed with history of rheumatoid arthritis as well as fibromyalgia. She apparently did have some issues with kidney function and was told to stop her anti-inflammatories. She states that just after Christmas of 2020, she began noticed soreness and achiness to the sternoclavicular joints and sternum and costochondral borders left greater than right. There was no history of actual injury or trauma prior to becoming symptomatic nor was there any history of upper respiratory illness or viral syndromes. She may have noticed some mild swelling and was complaining of some heaviness and did have a work-up with her primary care physicians. She was initially placed on a 6-day Medrol Dosepak with a substantial 80% improvement for the first few days while on the steroids. As she tapered off of this her symptoms did worsen once again and now she is having some tightness to the left side of her cervical spine.   She is denying high-grade radicular symptoms and is seeing us once again today for evaluation. Denies radicular symptoms and she is now only having episodic upper extremity numbness and tingling on the left. Once again her EMG was negative last year. Nathalie Tolentino was last seen in the office on 1/26/2021 primarily for worsening suspected synovitis of the left greater than right sternoclavicular joint as well as some costochondritis. She does have a longstanding history of cervical myofascial pain and has been working with her home stretching program.  Apparently she does have some kidney dysfunction and was told to stop her anti-inflammatories and when we saw her in January 2021, she did get a transient 60 to 70% improvement with her prednisone taper but her symptoms have progressed to the point where she is still having pain with active use of her left shoulder but localized majority of discomfort to the left SC joint. She has having some left shoulder stiffness as well as her baseline cervical discomfort and has been compliant with her home-based exercise program.  She has occasional been utilizing her Voltaren gel. Once again she does have an appointment to see Dr. Jess Mendoza at [facility] rheumatology but her appointment is not until May 2021. Again she was told she did have rheumatoid in the past but is never had Biologics or disease modifying medications. Denies true radicular symptoms. Montez Miller was last seen in the office on 3/10/2021 and was continued to have discomfort of her left greater than right sternoclavicular joint. She previously was treated for some costochondritis which had improved to some degree. She once again does have a longstanding history of cervical myofascial pain and has been working on her stretching program.  She did get some temporary improvement with her left sternoclavicular injection and is now having more soreness.   Once again she gets her greatest relief of her multiple joint arthralgias with oral steroids which gives her several weeks of pain extremity motor deficits and her cervical isometric strength testing appears intact. Special Tests: She does not exhibit significant pain with axial load testing. Negative Spurling's on the left and negative on the right. Once again no focal upper extremity motor deficits noted. She does appear to have a negative Tinel's at the carpal tunnel left greater than right with negative cubital tunnel Tinel's and no evidence of thenar atrophy. Skin: There are no rashes, ulcerations or lesions. Distal motor sensory and vascular exams intact. Gait: Reasonably fluid gait. Additional Comments:            Additional Examinations:      Right Upper Extremity:  Examination of the right upper extremity does not show any tenderness, deformity or injury. Range of motion is unremarkable. There is no gross instability. There are no rashes, ulcerations or lesions. Strength and tone are normal.  Left Upper Extremity: Examination of the left upper extremity does not show any tenderness, deformity or injury. Range of motion is unremarkable. There is no gross instability. There are no rashes, ulcerations or lesions. Strength and tone are normal.              Diagnostic Test Findings:    Cervical MRI performed at [facility] on 2020 as listed above     Narrative    Site: [facility] #: [id number] #: [id number] Procedure: MR Cervical Spine w/o Contrast ; Reason for Exam: Neck pain. Strain of neck muscle, initial encounter. Cervical myofascial pain syndrome. Left arm numbness    This document is confidential medical information.  Unauthorized disclosure or use of this information is prohibited by law. If you are not the intended recipient of this document, please advise us by calling immediately [phone].         [facility]    NIKO[facility] Carney              Patient Name: Chloe Gramajo    Case ID: [id number]    Patient : 1985    Referring Physician: Mckenna Martin Nicole Wilde MD    Exam Date: 07/29/2020    Exam Description: MR Cervical Spine w/o Contrast              HISTORY:   Neck pain.  Pain medial right clavicle.         TECHNICAL FACTORS:  Long- and short-axis fat- and water-weighted images were performed.         COMPARISON:  None.         FINDINGS:   No prevertebral soft tissue swelling or fracture.  Straightening of spine    presumably represents muscle spasm.         The craniocervical junction and cervical cord normal.         Minor spondylosis without substantial disc space narrowing.         Findings at individual levels demonstrate:         C2-3, C3-4, C4-5 unremarkable.         C5-6 shallow disc osteophyte complex with no neural compression.         C6-7 shallow central disc osteophyte complex with no neural compression.         C7-T1 unremarkable.         Visualized soft tissues of the neck normal.         CONCLUSION:    Muscle spasm and minor spondylosis.  No compressive abnormality in the cervical spine.         Thank you for the opportunity to provide your interpretation.                   Radha Ryder MD         A: OSIEL 07/29/2020 4:42 PM           Negative left upper extremity EMG per Dr. Kayleigh Putnam 8/4/2020    Chest MRI performed at [facility] on 3/16/2021 with attention to her sternoclavicular joints as listed above  Narrative   Site: [facility] #: [id number] #: [id number] Procedure: MR Chest w/o Contrast  ATTN: SC JOINT; Reason for Exam: ATTN:SC JOINT R/O SC SYNOVITIS, VILLONODULAR SYNOVITIS INVOLVING STERNOCLAVICULAR JOINT, CRVICAL MYOFASCIAL PAIN SYNDROME,    STERNAL PAIN, COSTOCHONDRITIS, STRAIN OF NECK MUSCLE, NECK PAIN   This document is confidential medical information.  Unauthorized disclosure or use of this information is prohibited by law. If you are not the intended recipient of this document, please advise us by calling immediately [phone].       [facility]         Patient Name: Tomasz Mcclure   Case ID: [id number]   Patient : 1985   Referring Physician: Luz Simental MD   Exam Date: 2021   Exam Description: MR Chest w/o Contrast  ATTN: SC JOINT           HISTORY:  Evaluate sternoclavicular joint synovitis.       TECHNICAL FACTORS:  Long- and short-axis fat- and water-weighted images were performed.       COMPARISON:  None.       FINDINGS:  No acute fracture or contusion.  No substantive active inflammation of    sternoclavicular joints.  No stress osseous edema.  No joint effusion.  No mass.       Visualized ribs normal in signal intensity.  Mild reversed cervical lordosis.       No acute muscle or tendon strain.       CONCLUSION:   No MRI evidence of active arthritis of sternoclavicular joints.       Thank you for the opportunity to provide your interpretation.               Rafael Basurto M.D.       A: Jose 2021 10:36 AM   T: DESEAN 2021 10:05 AM               Assessment: #1. Roughly 12 weeks status post suspected left greater than right sternoclavicular synovitis with improved costochondritis. #2 mild recurrent cervical pain with cervical myofascial pain with history of low-grade cervical spondylolysis and no evidence of compressive discopathy. #2.  Markedly improved left upper extremity numbness and tingling with negative left upper extremity EMG 2020  #.3. Chronic effectively untreated rheumatoid arthritis previously seen by Dr. Montana Darby with upcoming consultation with rheumatology Dr. Sandrine Valladares at [facility] on 2021    Impression:       Encounter Diagnoses   Name Primary?  Villonodular synovitis involving sternoclavicular joint, unspecified laterality Yes    Cervical myofascial pain syndrome     Sternal pain     Costochondritis     Strain of neck muscle, initial encounter     Neck pain        Office Procedures:       No orders of the defined types were placed in this encounter.       Treatment Plan:  Treatment options were stretching. I think we can see her back as needed but she will contact us with questions or concerns. This dictation was performed with a verbal recognition program (DRAGON) and it was checked for errors. It is possible that there are still dictated errors within this office note. If so, please bring any errors to my attention for an addendum. All efforts were made to ensure that this office note is accurate.

## 2022-09-29 NOTE — PROGRESS NOTES
Awilda Contreras MD  [facility]) Physicians - Rheumatology-[facility]      RHEUMATOLOGY CONSULT NOTE    HISTORY OF PRESENT ILLNESS:    The pt is a 28 y.o. female referred by Akash Arizmendi DO for evaluation due to history of rheumatoid arthritis    Patient states that at the age of 15, she started with pain and swelling affecting mainly the knees with significant morning stiffness but during her early 25s, she also started with pain and swelling affecting the hands as well as pain in the left lower back. She was diagnosed with rheumatoid arthritis and about 1 year later she started taking leflunomide for about 3 months which did not provide significant pain relief. Later on she tried Plaquenil 200 mg twice a day for 3 to 4 months with no significant improvement. Soon after, she tried methotrexate 74.4 mg/week and folic acid 1 mg daily with no significant relief either. Only high-dose of prednisone will control the symptoms. Currently on no medications for rheumatoid arthritis. About 1-1/2 years ago, she noticed inflammation of the fingers (such as fingers) as well as left de Quervain's tenosynovitis and bilateral plantar fasciitis that lasted for about 2 months. There was no associated inflammatory eyes (uveitis/iritis) or rashes. There is history of genital herpes but no any other sexually transmitted infection. Patient states having fatigue, non-refreshing sleep and cognitive problems. She has been evaluated because of neck pain and so she is going to have an MRI of the cervical spine. Daughter with history of juvenile idiopathic arthritis. 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 No recent falls or devices to ambulate. No previous rheumatological work-up is available. Previous available records/referral and labs were reviewed.       REVIEW OF SYSTEMS:    Constitutional: denies fever/chills, night sweats, unintentional weight loss  Integumentary: denies photosensitivity, rash, patchy alopecia, or Sx of Raynaud's phenomenon  Eyes: denies dry eyes, redness or pain, visual disturbance, or floaters  Ears: denies hearing loss, tinnitus, vertigo, or recurrent ear infections  Nose: denies nasal ulcers or recurrent sinusitis  Oral cavity: denies dry mouth or oral ulcers  Cardiovascular: denies CP, palpitations, Hx of pericardial effusion or pericarditis  Respiratory: denies SOB, cough, hemoptysis, or pleurisy  Gastrointestinal: denies heart burn, dysphagia or esophageal dysmotility, denies change in bowel habits or Sx of IBD  Genitourinary: denies change in urine amount or urine appearance, denies frothy urine or Hx of nephrolithiasis  Hematologic/Lymphatic: denies abnormal bruising or bleeding, denies Hx of blood clots or recurrent miscarriages, denies swollen LNs  Musculoskeletal:  refer to above HPI   Neurological: denies focal weakness, paresthesias/hyperesthesias or change in sensation, denies Hx of seizure, denies change in gait, balance, or memory  Psychiatric: denies Hx of depression or anxiety  Endocrine: denies cold or heat intolerance  Allergic/Immunologic: denies nasal congestion, chronic asthma, or hives    Past Medical History:   Diagnosis Date    Abnormal Pap smear of cervix     Chronic kidney disease     Fibromyalgia     Herpes simplex virus (HSV) infection     Migraines     Rheumatoid arthritis ([facility] [address]     Smoker         Past Surgical History:   Procedure Laterality Date    FINGER TRIGGER RELEASE Right     ring finger    FINGER TRIGGER RELEASE  2017    HAND SURGERY      RIGHT    HAND SURGERY      right       Social History     Socioeconomic History    Marital status:      Spouse name: Not on file    Number of children: Not on file    Years of education: Not on file    Highest education level: Not on file   Occupational History    Not on file   Social Needs    Financial resource strain: Not on file    Food insecurity     Worry: Not on PERRLA, no injection or icterus  NOSE: no nasal ulcers or nasal drainage  ORAL CAVITY: moist oral mucosa w/ good saliva pooling, no oral lesions, no evidence of thrush, no evidence of parotid gland enlargement  NECK: supple w/ FROM, of evidence of lymphadenopathy  CVS: RRR, no murmurs rubs or gallops, no JVD, 2+ distal pulses b/l  LUNGS: in no acute respiratory distress, CTAB  ABDOMEN: +BS, soft, NT/ND, no evidence of organomegaly  LYMPH: no cervical, supraclavicular or axillary LAD  MSK:  Spine: no kyphosis or lordosis, axial spine w/ FROM, no paraspinal muscle or vertebral tenderness, SI joints NTTP  Upper extremities:   Pain to palpation on the MCP and PIP joints bilaterally but there is no lauren synovitis fists   Wrist: no synovitis in the wrist joints b/l, FROM   Elbow: no synovitis or bursitis, FROM   Shoulders: no pain or swelling or warmth on palpation, FROM  Lower extremities:   Hip: normal log roll, negative CORRY test b/l, trochanteric bursa NTTP b/l   Knees: no warmth or effusion present, FROM   Ankles: no synovitis, FROM, Achilles tendons w/o swelling or warmth NTTP   Pain to palpation of the MTP joints with no synovitis              18 out of 18 tender points of fibromyalgia. NEURO: CN II-XII grossly intact intact, face appears symmetrical, normal DTR, no evidence of muscle atrophy, 5/5 strength proximally and distally throughout in both upper and lower extremities, touch sensation grossly intact  INTEGUMENTARY: no rash or psoriatic lesions, no petechiae, bruises, or palpable purpura, no patchy alopecia, no nail or periungual changes, no clubbing or digital ulcers  PSYCH: normal mood    Labs:   I personally reviewed prior labs including:    Lab Results   Component Value Date    WBC 10.5 07/22/2020    HGB 13.1 07/22/2020    HCT 38.3 07/22/2020    MCV 86.6 07/22/2020     07/22/2020    LYMPHOPCT 10.7 07/22/2020    RBC 4.43 07/22/2020    MCH 29.6 07/22/2020    MCHC 34.2 07/22/2020    RDW 13.0 07/22/2020     Lab Results   Component Value Date     07/22/2020    K 4.5 07/22/2020     07/22/2020    CO2 26 07/22/2020    BUN 17 07/22/2020    CREATININE 0.7 07/22/2020    GLUCOSE 116 (H) 07/22/2020    CALCIUM 9.4 07/22/2020    PROT 7.0 07/22/2020    LABALBU 4.8 07/22/2020    BILITOT <0.2 07/22/2020    ALKPHOS 69 07/22/2020    AST 15 07/22/2020    ALT 10 07/22/2020    LABGLOM >60 07/22/2020    GFRAA >60 07/22/2020    AGRATIO 2.2 07/22/2020    GLOB 2.2 07/22/2020         ASSESSMENT/PLAN:       1. History of rheumatoid arthritis  There are minimal arthralgias but no synovitis  There are no swan-neck or boutonniere deformities as well as no ulnar deviation  Work-up for rheumatoid arthritis came back negative\  Negative RF/CCP  NL ESR/CRP  No evidence of rheumatoid arthritis    2. Sacroiliitis (HCC)  There is history of enthesitis and dactylitis  History of sacroiliitis  No rashes or diarrhea  Requested work-up for a spondyloarthropathy  Negative HLA-B27   NL ESR/CRP (these inflammatory markers were done while the patient was on steroids)  Repeat ESR and CRP  Pending x-rays of the lumbar spine/SI joint    3. Fibromyalgia  Symptomatic  Information about this condition was provided  Discussed healthy diet (gluten-free/vegan)  Low impact exercise program was recommended  Discussed CBD oil  Start Cymbalta 20 mg daily  Continue with Flexeril 10 mg nightly\    5. Neck pain  Previous MRI of the cervical spine showed spondylosis  Pain may be related to DDD and fibromyalgia  Continue with Flexeril 10 mg nightly    6. Dry mouth  Minimally symptomatic  Negative work-up for Sjogren's  Medication related? 7.   Positive LUPE  ROS/PE unremarkable  Negative anti-Houston antibody/anti-double-stranded DNA  CBC with no cytopenias and urinalysis with no proteinuria or pathological casts      Orders Placed This Encounter   Procedures    C-Reactive Protein     Standing Status:   Future     Standing Expiration Date: 8/12/2021    Sedimentation Rate     Standing Status:   Future     Standing Expiration Date:   8/12/2021     Outpatient Encounter Medications as of 8/12/2020   Medication Sig Dispense Refill    DULoxetine (CYMBALTA) 20 MG extended release capsule Take 1 pill every night 30 capsule 2    cyclobenzaprine (FLEXERIL) 10 MG tablet Take 1 tablet by mouth 3 times daily as needed for Muscle spasms 30 tablet 0    diclofenac (VOLTAREN) 75 MG EC tablet Take 1 tablet by mouth 2 times daily 60 tablet 3     No facility-administered encounter medications on file as of 8/12/2020. Return 2 months. 8/12/2020 1:37 PM      Sending consult note to referring provider Elda Browning DO. Thank you very much for allowing me to participate in this pt's care. Please do not hesitate to contact me if I can be of further assistance. NOTE: This report is transcribed by using voice recognition software dragon. Every effort is made to ensure accuracy; however, inadvertent computerized transcription errors may be present.            Marquis Camarena MD Cephalexin Counseling: I counseled the patient regarding use of cephalexin as an antibiotic for prophylactic and/or therapeutic purposes. Cephalexin (commonly prescribed under brand name Keflex) is a cephalosporin antibiotic which is active against numerous classes of bacteria, including most skin bacteria. Side effects may include nausea, diarrhea, gastrointestinal upset, rash, hives, yeast infections, and in rare cases, hepatitis, kidney disease, seizures, fever, confusion, neurologic symptoms, and others. Patients with severe allergies to penicillin medications are cautioned that there is about a 10% incidence of cross-reactivity with cephalosporins. When possible, patients with penicillin allergies should use alternatives to cephalosporins for antibiotic therapy.